# Patient Record
Sex: MALE | Race: WHITE | NOT HISPANIC OR LATINO | Employment: OTHER
[De-identification: names, ages, dates, MRNs, and addresses within clinical notes are randomized per-mention and may not be internally consistent; named-entity substitution may affect disease eponyms.]

---

## 2017-01-09 ENCOUNTER — SURGERY (OUTPATIENT)
Age: 62
End: 2017-01-09

## 2017-01-09 ENCOUNTER — ANESTHESIA (OUTPATIENT)
Dept: ENDOSCOPY | Facility: HOSPITAL | Age: 62
End: 2017-01-09
Payer: COMMERCIAL

## 2017-01-09 ENCOUNTER — ANESTHESIA EVENT (OUTPATIENT)
Dept: ENDOSCOPY | Facility: HOSPITAL | Age: 62
End: 2017-01-09
Payer: COMMERCIAL

## 2017-01-09 VITALS — RESPIRATION RATE: 18 BRPM

## 2017-01-09 PROBLEM — Z86.0100 HX OF COLONIC POLYPS: Status: ACTIVE | Noted: 2017-01-09

## 2017-01-09 PROBLEM — Z86.010 HX OF COLONIC POLYPS: Status: ACTIVE | Noted: 2017-01-09

## 2017-01-09 PROCEDURE — 63600175 PHARM REV CODE 636 W HCPCS: Performed by: NURSE ANESTHETIST, CERTIFIED REGISTERED

## 2017-01-09 PROCEDURE — D9220A PRA ANESTHESIA: Mod: CRNA,,, | Performed by: NURSE ANESTHETIST, CERTIFIED REGISTERED

## 2017-01-09 PROCEDURE — 25000003 PHARM REV CODE 250: Performed by: NURSE ANESTHETIST, CERTIFIED REGISTERED

## 2017-01-09 PROCEDURE — D9220A PRA ANESTHESIA: Mod: ANES,,, | Performed by: ANESTHESIOLOGY

## 2017-01-09 RX ORDER — LIDOCAINE HCL/PF 100 MG/5ML
SYRINGE (ML) INTRAVENOUS
Status: DISCONTINUED | OUTPATIENT
Start: 2017-01-09 | End: 2017-01-09

## 2017-01-09 RX ORDER — PROPOFOL 10 MG/ML
VIAL (ML) INTRAVENOUS
Status: DISCONTINUED | OUTPATIENT
Start: 2017-01-09 | End: 2017-01-09

## 2017-01-09 RX ADMIN — PROPOFOL 50 MG: 10 INJECTION, EMULSION INTRAVENOUS at 10:01

## 2017-01-09 RX ADMIN — PROPOFOL 50 MG: 10 INJECTION, EMULSION INTRAVENOUS at 09:01

## 2017-01-09 RX ADMIN — LIDOCAINE HYDROCHLORIDE 100 MG: 20 INJECTION, SOLUTION INTRAVENOUS at 09:01

## 2017-01-09 RX ADMIN — PROPOFOL 100 MG: 10 INJECTION, EMULSION INTRAVENOUS at 09:01

## 2017-01-09 NOTE — ANESTHESIA POSTPROCEDURE EVALUATION
Anesthesia Post Evaluation    Patient: Matt Martinez    Procedure(s) Performed: Procedure(s) (LRB):  COLONOSCOPY (N/A)    Final Anesthesia Type: general  Patient location during evaluation: PACU  Patient participation: Yes- Able to Participate  Level of consciousness: awake and alert  Post-procedure vital signs: reviewed and stable  Pain management: adequate  Airway patency: patent  PONV status at discharge: No PONV  Anesthetic complications: no      Cardiovascular status: hemodynamically stable  Respiratory status: unassisted and room air  Hydration status: euvolemic  Follow-up not needed.        Visit Vitals    /67 (BP Location: Left arm, Patient Position: Lying, BP Method: Automatic)    Pulse 73    Temp 36.4 °C (97.6 °F) (Oral)    Resp 15    Ht 6' (1.829 m)    Wt (!) 176.9 kg (390 lb)    SpO2 95%    BMI 52.89 kg/m2       Pain/Gail Score: Pain Assessment Performed: Yes (1/9/2017 10:22 AM)  Presence of Pain: denies (1/9/2017 10:22 AM)  Pain Rating Prior to Med Admin: 1 (1/9/2017 10:22 AM)  Pain Rating Post Med Admin: 1 (1/9/2017 10:22 AM)  Gail Score: 10 (1/9/2017 10:22 AM)

## 2017-01-09 NOTE — TRANSFER OF CARE
Anesthesia Transfer of Care Note    Patient: Matt Martinez    Procedure(s) Performed: Procedure(s) (LRB):  COLONOSCOPY (N/A)    Patient location: GI    Anesthesia Type: general    Transport from OR: Transported from OR on 2-3 L/min O2 by NC with adequate spontaneous ventilation    Post pain: adequate analgesia    Post assessment: no apparent anesthetic complications    Post vital signs: stable    Level of consciousness: awake, oriented and alert    Nausea/Vomiting: no nausea/vomiting    Complications: none          Last vitals:   Visit Vitals    /64    Pulse 74    Temp 36.8 °C (98.2 °F) (Oral)    Resp (!) 24    Ht 6' (1.829 m)    Wt (!) 176.9 kg (390 lb)    SpO2 (!) 91%    BMI 52.89 kg/m2

## 2017-01-09 NOTE — ANESTHESIA PREPROCEDURE EVALUATION
01/09/2017  Matt Martinez is a 61 y.o., male.    [facility] Anesthesia Evaluation    I have reviewed the Patient Summary Reports.    I have reviewed the Nursing Notes.      Review of Systems  Anesthesia Hx:  No problems with previous Anesthesia    Social:  Non-Smoker    Hematology/Oncology:  Hematology Normal   Oncology Normal     EENT/Dental:EENT/Dental Normal   Cardiovascular:   Hypertension, well controlled    Pulmonary:  Pulmonary Normal    Renal/:  Renal/ Normal     Hepatic/GI:   Bowel Prep.    Musculoskeletal:  Musculoskeletal Normal    Neurological:  Neurology Normal    Endocrine:  Endocrine Normal    Dermatological:  Skin Normal    Psych:  Psychiatric Normal           Physical Exam  General:  Morbid Obesity    Airway/Jaw/Neck:  AIRWAY FINDINGS: Normal      Eyes/Ears/Nose:  EYES/EARS/NOSE FINDINGS: Normal   Dental:  DENTAL FINDINGS: Normal   Chest/Lungs:  Chest/Lungs Findings: Clear to auscultation     Heart/Vascular:  Heart Findings: Rate: Normal  Rhythm: Regular Rhythm  Sounds: Normal  Heart murmur: negative Vascular Findings: Normal    Abdomen:  Abdomen Findings: Normal    Musculoskeletal:  Musculoskeletal Findings: Normal   Skin:  Skin Findings: Normal    Mental Status:  Mental Status Findings: Normal        Anesthesia Plan  Type of Anesthesia, risks & benefits discussed:  Anesthesia Type:  general  Patient's Preference:   Intra-op Monitoring Plan:   Intra-op Monitoring Plan Comments:   Post Op Pain Control Plan:   Post Op Pain Control Plan Comments:   Induction:   IV  Beta Blocker:  Patient is not currently on a Beta-Blocker (No further documentation required).       Informed Consent: Patient understands risks and agrees with Anesthesia plan.  Questions answered. Anesthesia consent signed with patient.  ASA Score: 3     Day of Surgery Review of History & Physical:    H&P update referred to the  provider.         Ready For Surgery From Anesthesia Perspective.

## 2020-06-24 ENCOUNTER — LAB VISIT (OUTPATIENT)
Dept: PRIMARY CARE CLINIC | Facility: OTHER | Age: 65
End: 2020-06-24
Payer: COMMERCIAL

## 2020-06-24 DIAGNOSIS — Z11.59 SPECIAL SCREENING EXAMINATION FOR UNSPECIFIED VIRAL DISEASE: ICD-10-CM

## 2020-06-24 PROCEDURE — U0003 INFECTIOUS AGENT DETECTION BY NUCLEIC ACID (DNA OR RNA); SEVERE ACUTE RESPIRATORY SYNDROME CORONAVIRUS 2 (SARS-COV-2) (CORONAVIRUS DISEASE [COVID-19]), AMPLIFIED PROBE TECHNIQUE, MAKING USE OF HIGH THROUGHPUT TECHNOLOGIES AS DESCRIBED BY CMS-2020-01-R: HCPCS | Mod: ST72

## 2020-06-28 LAB — SARS-COV-2 RNA RESP QL NAA+PROBE: DETECTED

## 2021-05-06 ENCOUNTER — PATIENT MESSAGE (OUTPATIENT)
Dept: RESEARCH | Facility: HOSPITAL | Age: 66
End: 2021-05-06

## 2022-08-04 ENCOUNTER — OFFICE VISIT (OUTPATIENT)
Dept: ORTHOPEDICS | Facility: CLINIC | Age: 67
End: 2022-08-04
Payer: MEDICARE

## 2022-08-04 VITALS — HEIGHT: 72 IN | WEIGHT: 315 LBS | BODY MASS INDEX: 42.66 KG/M2

## 2022-08-04 DIAGNOSIS — M79.604 PAIN AND SWELLING OF RIGHT LOWER EXTREMITY: ICD-10-CM

## 2022-08-04 DIAGNOSIS — M79.89 PAIN AND SWELLING OF RIGHT LOWER EXTREMITY: ICD-10-CM

## 2022-08-04 DIAGNOSIS — I73.9 VASCULAR CLAUDICATION: ICD-10-CM

## 2022-08-04 DIAGNOSIS — M48.061 LUMBAR FORAMINAL STENOSIS: ICD-10-CM

## 2022-08-04 DIAGNOSIS — M17.11 PRIMARY OSTEOARTHRITIS OF RIGHT KNEE: Primary | ICD-10-CM

## 2022-08-04 PROCEDURE — 20610 LARGE JOINT ASPIRATION/INJECTION: R KNEE: ICD-10-PCS | Mod: RT,S$GLB,, | Performed by: ORTHOPAEDIC SURGERY

## 2022-08-04 PROCEDURE — 1101F PT FALLS ASSESS-DOCD LE1/YR: CPT | Mod: CPTII,S$GLB,, | Performed by: ORTHOPAEDIC SURGERY

## 2022-08-04 PROCEDURE — 3008F PR BODY MASS INDEX (BMI) DOCUMENTED: ICD-10-PCS | Mod: CPTII,S$GLB,, | Performed by: ORTHOPAEDIC SURGERY

## 2022-08-04 PROCEDURE — 1160F PR REVIEW ALL MEDS BY PRESCRIBER/CLIN PHARMACIST DOCUMENTED: ICD-10-PCS | Mod: CPTII,S$GLB,, | Performed by: ORTHOPAEDIC SURGERY

## 2022-08-04 PROCEDURE — 3008F BODY MASS INDEX DOCD: CPT | Mod: CPTII,S$GLB,, | Performed by: ORTHOPAEDIC SURGERY

## 2022-08-04 PROCEDURE — 99203 PR OFFICE/OUTPT VISIT, NEW, LEVL III, 30-44 MIN: ICD-10-PCS | Mod: 25,S$GLB,, | Performed by: ORTHOPAEDIC SURGERY

## 2022-08-04 PROCEDURE — 1125F PR PAIN SEVERITY QUANTIFIED, PAIN PRESENT: ICD-10-PCS | Mod: CPTII,S$GLB,, | Performed by: ORTHOPAEDIC SURGERY

## 2022-08-04 PROCEDURE — 99203 OFFICE O/P NEW LOW 30 MIN: CPT | Mod: 25,S$GLB,, | Performed by: ORTHOPAEDIC SURGERY

## 2022-08-04 PROCEDURE — 3288F FALL RISK ASSESSMENT DOCD: CPT | Mod: CPTII,S$GLB,, | Performed by: ORTHOPAEDIC SURGERY

## 2022-08-04 PROCEDURE — 20610 DRAIN/INJ JOINT/BURSA W/O US: CPT | Mod: RT,S$GLB,, | Performed by: ORTHOPAEDIC SURGERY

## 2022-08-04 PROCEDURE — 1160F RVW MEDS BY RX/DR IN RCRD: CPT | Mod: CPTII,S$GLB,, | Performed by: ORTHOPAEDIC SURGERY

## 2022-08-04 PROCEDURE — 1101F PR PT FALLS ASSESS DOC 0-1 FALLS W/OUT INJ PAST YR: ICD-10-PCS | Mod: CPTII,S$GLB,, | Performed by: ORTHOPAEDIC SURGERY

## 2022-08-04 PROCEDURE — 1159F PR MEDICATION LIST DOCUMENTED IN MEDICAL RECORD: ICD-10-PCS | Mod: CPTII,S$GLB,, | Performed by: ORTHOPAEDIC SURGERY

## 2022-08-04 PROCEDURE — 1159F MED LIST DOCD IN RCRD: CPT | Mod: CPTII,S$GLB,, | Performed by: ORTHOPAEDIC SURGERY

## 2022-08-04 PROCEDURE — 3288F PR FALLS RISK ASSESSMENT DOCUMENTED: ICD-10-PCS | Mod: CPTII,S$GLB,, | Performed by: ORTHOPAEDIC SURGERY

## 2022-08-04 PROCEDURE — 1125F AMNT PAIN NOTED PAIN PRSNT: CPT | Mod: CPTII,S$GLB,, | Performed by: ORTHOPAEDIC SURGERY

## 2022-08-04 RX ORDER — TRIAMCINOLONE ACETONIDE 40 MG/ML
40 INJECTION, SUSPENSION INTRA-ARTICULAR; INTRAMUSCULAR
Status: DISCONTINUED | OUTPATIENT
Start: 2022-08-04 | End: 2022-08-04 | Stop reason: HOSPADM

## 2022-08-04 RX ORDER — NAPROXEN 500 MG/1
500 TABLET ORAL DAILY PRN
COMMUNITY
Start: 2022-07-08 | End: 2022-09-20

## 2022-08-04 RX ORDER — GABAPENTIN 300 MG/1
CAPSULE ORAL
COMMUNITY
Start: 2022-05-01 | End: 2022-09-20

## 2022-08-04 RX ORDER — HYDROCODONE BITARTRATE AND ACETAMINOPHEN 5; 325 MG/1; MG/1
TABLET ORAL
COMMUNITY
Start: 2022-07-08 | End: 2022-09-20

## 2022-08-04 RX ADMIN — TRIAMCINOLONE ACETONIDE 40 MG: 40 INJECTION, SUSPENSION INTRA-ARTICULAR; INTRAMUSCULAR at 08:08

## 2022-08-04 NOTE — PROCEDURES
Large Joint Aspiration/Injection: R knee    Date/Time: 8/4/2022 8:30 AM  Performed by: Romeo Mccarty MD  Authorized by: Romeo Mccarty MD     Consent Done?:  Yes (Verbal)  Indications:  Pain  Site marked: the procedure site was marked    Timeout: prior to procedure the correct patient, procedure, and site was verified    Prep: patient was prepped and draped in usual sterile fashion      Local anesthesia used?: Yes    Local anesthetic:  Lidocaine 1% without epinephrine  Ultrasonic Guidance for needle placement?: No    Location:  Knee  Site:  R knee  Medications:  40 mg triamcinolone acetonide 40 mg/mL  Patient tolerance:  Patient tolerated the procedure well with no immediate complications

## 2022-08-04 NOTE — PROGRESS NOTES
[facility]    Subjective:     Chief Complaint:   Chief Complaint   Patient presents with    Right Lower Leg - Pain     Pain in right calf that has been going on since December. Had had MRI with Dr Pacheco on back and it is not his back, was sent to Dr Little and was told it is muscular. Very painful to walk. Describes as an icepick sensation. Also had lower leg MRI       Past Medical History:   Diagnosis Date    Colon polyp     HTN (hypertension)        Past Surgical History:   Procedure Laterality Date    COLONOSCOPY  2011    colon polyps removed    COLONOSCOPY N/A 1/9/2017    Procedure: COLONOSCOPY;  Surgeon: Alexei Claudio MD;  Location: [facility];  Service: Endoscopy;  Laterality: N/A;       Current Outpatient Medications   Medication Sig    amlodipine (NORVASC) 10 MG tablet Take 15 mg by mouth once daily.    gabapentin (NEURONTIN) 300 MG capsule     hydrochlorothiazide (HYDRODIURIL) 25 MG tablet Take 25 mg by mouth once daily.    HYDROcodone-acetaminophen (NORCO) 5-325 mg per tablet     losartan (COZAAR) 50 MG tablet Take 50 mg by mouth 2 (two) times daily.    naproxen (NAPROSYN) 500 MG tablet Take 500 mg by mouth daily as needed.     No current facility-administered medications for this visit.       Review of patient's allergies indicates:   Allergen Reactions    Simvastatin      Other reaction(s): Muscle pain       Family History   Problem Relation Age of Onset    Pancreatic cancer Mother     Colon cancer Brother         unsure of age of diagnosis    Crohn's disease Neg Hx     Ulcerative colitis Neg Hx        Social History     Socioeconomic History    Marital status:    Tobacco Use    Smoking status: Never Smoker    Smokeless tobacco: Never Used   Substance and Sexual Activity    Alcohol use: Yes     Comment: socially    Drug use: No       History of present illness:  Patient comes in today for the right leg.  He has had right leg pain for about a year.  He has had  extensive workup by an excellent outside orthopedist.  Unfortunately has persistent discomfort      Review of Systems:    Constitution: Negative for chills, fever, and sweats.  Negative for unexplained weight loss.    HENT:  Negative for headaches and blurry vision.    Cardiovascular:Negative for chest pain or irregular heart beat. Negative for hypertension.    Respiratory:  Negative for cough and shortness of breath.    Gastrointestinal: Negative for abdominal pain, heartburn, melena, nausea, and vomitting.    Genitourinary:  Negative bladder incontinence and dysuria.    Musculoskeletal:  See HPI for details.     Neurological: Negative for numbness.    Psychiatric/Behavioral: Negative for depression.  The patient is not nervous/anxious.      Endocrine: Negative for polyuria    Hematologic/Lymphatic: Negative for bleeding problem.  Does not bruise/bleed easily.    Skin: Negative for poor would healing and rash    Objective:      Physical Examination:    Vital Signs:  There were no vitals filed for this visit.    Body mass index is 52.89 kg/m².    This a well-developed, well nourished patient in no acute distress.  They are alert and oriented and cooperative to examination.        Patient has a positive straight leg raise on the right.  He has good pulses in the lower extremities.  He has varus deformities of the bilateral knees.  He has 1+ effusion of the bilateral knees.  Pertinent New Results:  MRI of the lumbar spine is reviewed.  MRI of the right lower extremity is reviewed.  Those MRIs are attached.  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    Assessment/Plan:      Severe spinal stenosis with radicular pain.  I truly believe that this is with generalized discomfort is coming from.  He has a very positive straight leg raise.  His pain is concordant.  I believe he needs  an epidural steroid injection.  We have sent him for that.  I also want him in evaluated by Dr. Victor.  We will see him back p.r.n.      This note was created using Dragon voice recognition software that occasionally misinterpreted phrases or words.

## 2022-08-15 ENCOUNTER — OFFICE VISIT (OUTPATIENT)
Dept: ORTHOPEDICS | Facility: CLINIC | Age: 67
End: 2022-08-15
Payer: MEDICARE

## 2022-08-15 ENCOUNTER — TELEPHONE (OUTPATIENT)
Dept: PAIN MEDICINE | Facility: CLINIC | Age: 67
End: 2022-08-15
Payer: MEDICARE

## 2022-08-15 VITALS — HEIGHT: 72 IN | BODY MASS INDEX: 42.66 KG/M2 | WEIGHT: 315 LBS

## 2022-08-15 DIAGNOSIS — M48.061 LUMBAR FORAMINAL STENOSIS: Primary | ICD-10-CM

## 2022-08-15 DIAGNOSIS — M47.816 FACET ARTHRITIS OF LUMBAR REGION: ICD-10-CM

## 2022-08-15 DIAGNOSIS — M51.36 LUMBAR DEGENERATIVE DISC DISEASE: ICD-10-CM

## 2022-08-15 DIAGNOSIS — M51.26 PROTRUSION OF LUMBAR INTERVERTEBRAL DISC: ICD-10-CM

## 2022-08-15 PROCEDURE — 1159F MED LIST DOCD IN RCRD: CPT | Mod: CPTII,S$GLB,, | Performed by: ORTHOPAEDIC SURGERY

## 2022-08-15 PROCEDURE — 1160F PR REVIEW ALL MEDS BY PRESCRIBER/CLIN PHARMACIST DOCUMENTED: ICD-10-PCS | Mod: CPTII,S$GLB,, | Performed by: ORTHOPAEDIC SURGERY

## 2022-08-15 PROCEDURE — 3008F BODY MASS INDEX DOCD: CPT | Mod: CPTII,S$GLB,, | Performed by: ORTHOPAEDIC SURGERY

## 2022-08-15 PROCEDURE — 1125F AMNT PAIN NOTED PAIN PRSNT: CPT | Mod: CPTII,S$GLB,, | Performed by: ORTHOPAEDIC SURGERY

## 2022-08-15 PROCEDURE — 1101F PT FALLS ASSESS-DOCD LE1/YR: CPT | Mod: CPTII,S$GLB,, | Performed by: ORTHOPAEDIC SURGERY

## 2022-08-15 PROCEDURE — 3288F PR FALLS RISK ASSESSMENT DOCUMENTED: ICD-10-PCS | Mod: CPTII,S$GLB,, | Performed by: ORTHOPAEDIC SURGERY

## 2022-08-15 PROCEDURE — 99213 OFFICE O/P EST LOW 20 MIN: CPT | Mod: S$GLB,,, | Performed by: ORTHOPAEDIC SURGERY

## 2022-08-15 PROCEDURE — 1125F PR PAIN SEVERITY QUANTIFIED, PAIN PRESENT: ICD-10-PCS | Mod: CPTII,S$GLB,, | Performed by: ORTHOPAEDIC SURGERY

## 2022-08-15 PROCEDURE — 1160F RVW MEDS BY RX/DR IN RCRD: CPT | Mod: CPTII,S$GLB,, | Performed by: ORTHOPAEDIC SURGERY

## 2022-08-15 PROCEDURE — 1159F PR MEDICATION LIST DOCUMENTED IN MEDICAL RECORD: ICD-10-PCS | Mod: CPTII,S$GLB,, | Performed by: ORTHOPAEDIC SURGERY

## 2022-08-15 PROCEDURE — 3008F PR BODY MASS INDEX (BMI) DOCUMENTED: ICD-10-PCS | Mod: CPTII,S$GLB,, | Performed by: ORTHOPAEDIC SURGERY

## 2022-08-15 PROCEDURE — 3288F FALL RISK ASSESSMENT DOCD: CPT | Mod: CPTII,S$GLB,, | Performed by: ORTHOPAEDIC SURGERY

## 2022-08-15 PROCEDURE — 1101F PR PT FALLS ASSESS DOC 0-1 FALLS W/OUT INJ PAST YR: ICD-10-PCS | Mod: CPTII,S$GLB,, | Performed by: ORTHOPAEDIC SURGERY

## 2022-08-15 PROCEDURE — 99213 PR OFFICE/OUTPT VISIT, EST, LEVL III, 20-29 MIN: ICD-10-PCS | Mod: S$GLB,,, | Performed by: ORTHOPAEDIC SURGERY

## 2022-08-15 NOTE — TELEPHONE ENCOUNTER
Procedure Information    Service Details  Procedure Modifiers Provider Requested Approved   [id number] - AMB REFERRAL/CONSULT TO PAIN CLINIC None Matt Ma MD 1 1     Diagnosis Information    Diagnosis   M48.061 (ICD-10-CM) - Lumbar foraminal stenosis   M51.36 (ICD-10-CM) - Lumbar degenerative disc disease   M47.816 (ICD-10-CM) - Facet arthritis of lumbar region       Referral Notes  Number of Notes: 1    .  Type Date User Summary Attachment   Provider Comments 08/15/2022  3:17 PM Brock Victor MD Provider Comments -   Note    Eval and Treat for Right Lumbar TFESI vs Right Lumbar Medial Branch Block Injections.                Ok to schedule or does he need an apt? Thanks

## 2022-08-16 ENCOUNTER — TELEPHONE (OUTPATIENT)
Dept: PAIN MEDICINE | Facility: CLINIC | Age: 67
End: 2022-08-16
Payer: MEDICARE

## 2022-08-16 NOTE — TELEPHONE ENCOUNTER
----- Message from Brittney Veronica sent at 8/16/2022 10:00 AM CDT -----  Contact: WifeRosa  Type: Appt Question    Who Called:  Wife    Best Call Back Number: [phone]    Additional Information: Has questions about pt's appt.  Please call back. Thanks.

## 2022-08-16 NOTE — TELEPHONE ENCOUNTER
Wife wanted to know if the apt was for the procedure. I explained no, it's an office visit to discuss the procedure.

## 2022-09-06 ENCOUNTER — TELEPHONE (OUTPATIENT)
Dept: PAIN MEDICINE | Facility: CLINIC | Age: 67
End: 2022-09-06

## 2022-09-06 ENCOUNTER — OFFICE VISIT (OUTPATIENT)
Dept: PAIN MEDICINE | Facility: CLINIC | Age: 67
End: 2022-09-06
Payer: MEDICARE

## 2022-09-06 VITALS
WEIGHT: 315 LBS | SYSTOLIC BLOOD PRESSURE: 150 MMHG | HEART RATE: 67 BPM | DIASTOLIC BLOOD PRESSURE: 89 MMHG | HEIGHT: 73 IN | BODY MASS INDEX: 41.75 KG/M2

## 2022-09-06 DIAGNOSIS — M54.16 CHRONIC LUMBAR RADICULOPATHY: Primary | ICD-10-CM

## 2022-09-06 DIAGNOSIS — M47.896 OTHER SPONDYLOSIS, LUMBAR REGION: ICD-10-CM

## 2022-09-06 DIAGNOSIS — M51.36 DDD (DEGENERATIVE DISC DISEASE), LUMBAR: ICD-10-CM

## 2022-09-06 PROCEDURE — 1101F PT FALLS ASSESS-DOCD LE1/YR: CPT | Mod: CPTII,S$GLB,, | Performed by: ANESTHESIOLOGY

## 2022-09-06 PROCEDURE — 3077F PR MOST RECENT SYSTOLIC BLOOD PRESSURE >= 140 MM HG: ICD-10-PCS | Mod: CPTII,S$GLB,, | Performed by: ANESTHESIOLOGY

## 2022-09-06 PROCEDURE — 99204 OFFICE O/P NEW MOD 45 MIN: CPT | Mod: S$GLB,,, | Performed by: ANESTHESIOLOGY

## 2022-09-06 PROCEDURE — 3079F DIAST BP 80-89 MM HG: CPT | Mod: CPTII,S$GLB,, | Performed by: ANESTHESIOLOGY

## 2022-09-06 PROCEDURE — 99204 PR OFFICE/OUTPT VISIT, NEW, LEVL IV, 45-59 MIN: ICD-10-PCS | Mod: S$GLB,,, | Performed by: ANESTHESIOLOGY

## 2022-09-06 PROCEDURE — 1159F MED LIST DOCD IN RCRD: CPT | Mod: CPTII,S$GLB,, | Performed by: ANESTHESIOLOGY

## 2022-09-06 PROCEDURE — 3008F BODY MASS INDEX DOCD: CPT | Mod: CPTII,S$GLB,, | Performed by: ANESTHESIOLOGY

## 2022-09-06 PROCEDURE — 1159F PR MEDICATION LIST DOCUMENTED IN MEDICAL RECORD: ICD-10-PCS | Mod: CPTII,S$GLB,, | Performed by: ANESTHESIOLOGY

## 2022-09-06 PROCEDURE — 3077F SYST BP >= 140 MM HG: CPT | Mod: CPTII,S$GLB,, | Performed by: ANESTHESIOLOGY

## 2022-09-06 PROCEDURE — 3079F PR MOST RECENT DIASTOLIC BLOOD PRESSURE 80-89 MM HG: ICD-10-PCS | Mod: CPTII,S$GLB,, | Performed by: ANESTHESIOLOGY

## 2022-09-06 PROCEDURE — 3288F PR FALLS RISK ASSESSMENT DOCUMENTED: ICD-10-PCS | Mod: CPTII,S$GLB,, | Performed by: ANESTHESIOLOGY

## 2022-09-06 PROCEDURE — 1125F AMNT PAIN NOTED PAIN PRSNT: CPT | Mod: CPTII,S$GLB,, | Performed by: ANESTHESIOLOGY

## 2022-09-06 PROCEDURE — 1101F PR PT FALLS ASSESS DOC 0-1 FALLS W/OUT INJ PAST YR: ICD-10-PCS | Mod: CPTII,S$GLB,, | Performed by: ANESTHESIOLOGY

## 2022-09-06 PROCEDURE — 99999 PR PBB SHADOW E&M-EST. PATIENT-LVL III: ICD-10-PCS | Mod: PBBFAC,,, | Performed by: ANESTHESIOLOGY

## 2022-09-06 PROCEDURE — 1125F PR PAIN SEVERITY QUANTIFIED, PAIN PRESENT: ICD-10-PCS | Mod: CPTII,S$GLB,, | Performed by: ANESTHESIOLOGY

## 2022-09-06 PROCEDURE — 3008F PR BODY MASS INDEX (BMI) DOCUMENTED: ICD-10-PCS | Mod: CPTII,S$GLB,, | Performed by: ANESTHESIOLOGY

## 2022-09-06 PROCEDURE — 99999 PR PBB SHADOW E&M-EST. PATIENT-LVL III: CPT | Mod: PBBFAC,,, | Performed by: ANESTHESIOLOGY

## 2022-09-06 PROCEDURE — 3288F FALL RISK ASSESSMENT DOCD: CPT | Mod: CPTII,S$GLB,, | Performed by: ANESTHESIOLOGY

## 2022-09-06 NOTE — PROGRESS NOTES
Referring Physician: Brock Victor MD    PCP: Reshma Fairbanks MD    CC: back and leg pain    HPI:   Matt Martinez is a 67 y.o. male with PMH significant for HTN and obesity presents as a referral for the evaluation of back and leg pain. The patient reports that his pain began approximately Christmas 2021 after no inciting incident or trauma. The patient denies of improvement since onset. The patient localizes his worst pain to the posterolateral aspect of his right calf. The patient believes his calf and back pain are related. The patient denies of radiation into his right foot. The patient describes his pain as a sharp type of pain. The patient denies of numbness. The patient reports that his pain is a 0/10 as he is currently sitting. The patient reports that his pain increases to a 9/10 at its worst. Patient denies of any urinary/fecal incontinence, saddle anesthesia, or weakness.      Aggravating factors: standing up straight    Mitigating factors: leaning forward and putting his hands on his knees    Relevant Surgeries: no    Interventional Therapies: yes; knee injection via Dr. Mccarty - denies of any benefit.     : Not applicable      Non-pharmacologic Treatment:     Physical Therapy: yes; completed 6 weeks of physical therapy and dry needling. Dry needling provided no benefit.   Ice/Heat: no  TENS: yes  Massage: no  Chiropractic care: no  Acupuncture: no  Other: Relief Factor         Pain Medications:         Currently taking: salon pas; Tylenol PM QHS    Has tried in the past:    Opioids: yes; norco   NSAIDS: yes; naproxen  Tylenol: yes  Muscle relaxants: no  TCAs: no  SNRIs: no  Anticonvulsants: yes; gabapentin  topical creams: yes    Anticoagulation: no    ROS:  Review of Systems   Constitutional:  Negative for chills and fever.   HENT:  Negative for tinnitus.    Eyes:  Negative for visual disturbance.   Respiratory:  Negative for shortness of breath.    Cardiovascular:  Negative for chest pain.  "  Gastrointestinal:  Negative for nausea and vomiting.   Genitourinary:  Negative for difficulty urinating.   Musculoskeletal:  Positive for back pain.   Skin:  Negative for rash.   Allergic/Immunologic: Negative for immunocompromised state.   Neurological:  Negative for syncope.   Hematological:  Does not bruise/bleed easily.   Psychiatric/Behavioral:  Negative for suicidal ideas.       Past Medical History:   Diagnosis Date    Colon polyp     HTN (hypertension)      Past Surgical History:   Procedure Laterality Date    COLONOSCOPY  2011    colon polyps removed    COLONOSCOPY N/A 1/9/2017    Procedure: COLONOSCOPY;  Surgeon: Alexei Claudio MD;  Location: [facility];  Service: Endoscopy;  Laterality: N/A;     Family History   Problem Relation Age of Onset    Pancreatic cancer Mother     Colon cancer Brother         unsure of age of diagnosis    Crohn's disease Neg Hx     Ulcerative colitis Neg Hx      Social History     Socioeconomic History    Marital status:    Tobacco Use    Smoking status: Never    Smokeless tobacco: Never   Substance and Sexual Activity    Alcohol use: Yes     Comment: socially    Drug use: No         Allergies: See med card    Vitals:    09/06/22 0833   BP: (!) 150/89   Pulse: 67   Weight: (!) 176.9 kg (390 lb)   Height: 6' 1" (1.854 m)   PainSc:   5   PainLoc: Back         Physical exam:  Physical Exam  Vitals and nursing note reviewed.   Constitutional:       Appearance: Normal appearance. He is not toxic-appearing or diaphoretic.   HENT:      Head: Normocephalic and atraumatic.   Eyes:      General:         Right eye: No discharge.         Left eye: No discharge.      Extraocular Movements: Extraocular movements intact.      Conjunctiva/sclera: Conjunctivae normal.   Cardiovascular:      Rate and Rhythm: Normal rate.   Pulmonary:      Effort: Pulmonary effort is normal. No respiratory distress.      Breath sounds: Normal breath sounds.   Abdominal:      Palpations: Abdomen is soft. "   Skin:     General: Skin is warm and dry.      Findings: No rash.   Neurological:      Mental Status: He is alert and oriented to person, place, and time.   Psychiatric:         Mood and Affect: Mood and affect normal.         Cognition and Memory: Memory normal.         Judgment: Judgment normal.        UPPER EXTREMITIES: Normal alignment, normal range of motion, no atrophy, no skin changes,  hair growth and nail growth normal and equal bilaterally. No swelling, no tenderness.    LOWER EXTREMITIES:  Normal alignment, normal range of motion, no atrophy, no skin changes,  hair growth and nail growth normal and equal bilaterally. No swelling, no tenderness.    LUMBAR SPINE  Lumbar spine: ROM is full with flexion but limited with extension and oblique extension with increased pain with extension but relief with flexion.    ((+)) Supine straight leg raise on the right   ((+)) Facet loading   Internal and external rotation of the hip causes no increased pain on either side.  Myofascial exam: No tenderness to palpation across lumbar paraspinous muscles.    ((--)) TTP at the SI joint  ((+)) CONSTANTINO's test  ((--)) Distraction test  ((+)) Thigh thrust    CRANIAL NERVES:  II:  PERRL bilaterally,   III,IV,VI: EOMI.    V:  Facial sensation equal bilaterally  VII:  Facial motor function normal.  VIII:  Hearing equal to finger rub bilaterally  IX/X: Gag normal, palate symmetric  XI:  Shoulder shrug equal, head turn equal  XII:  Tongue midline without fasciculations      MOTOR: Tone and bulk: normal     MUSCLE STRENGTH:  Hip Flexion: Right 5/5, Left 5/5  Hip Extension: Right 5/5, Left 5/5  Leg Flexion: Right 5/5, Left 5/5  Leg Extension: Right 5/5, Left 5/5  Plantar Flexion: Right 5/5, Left 5/5  Dorsiflexion: Right 5/5, Left 5/5    Delt Bi Tri     Right: 5/5 5/5 5/5 5/5   Left: 5/5 5/5 5/5 5/5     SENSATION: Light touch and pinprick intact bilaterally  REFLEXES: normal, symmetric, nonbrisk.  Toes down, no clonus. Negative  beyer's sign bilaterally.  GAIT: normal rise, base, steps, and arm swing.      Imaging:            Assessment: Matt Martinez is a 67 y.o. male with PMH significant for HTN and obesity presents as a referral for the evaluation of back and leg pain. The patient localizes his worst pain to the posterolateral aspect of his right calf. The patient believes his calf and back pain are related. Patient with positive SLR on the right side on physical examination. MRI of the lumbar spine significant for severe, multi-level DDD and neural foraminal stenosis. I do believe a component of his pain has contributions from chronic lumbar radiculopathy. Treatment plan outlined below.     Plan:  - Schedule for right L3-L4, L4-L5, L5-S1 transforaminal epidural steroid injeciton  - I have stressed the importance of physical activity and a home exercise plan to help with chronic pain and improve health.  - Continue OTC Tylenol for pain  - RTC for the procedure as outlined above     Thank you for referring this interesting patient, and I look forward to continuing to collaborate in his care.    Mara Jackson MD  Pain Management

## 2022-09-06 NOTE — TELEPHONE ENCOUNTER
Dr Jackson , insurance will only approve 2 levels. Which one would you like to get rid of before I submit? Thank you         Procedure -> Transforaminal Injection (Specify level and laterality) Cmt: Right              L3-L4, L4-L5, L5-S1 transforaminal epidural steroid injection      Order Specific Information   Order: Procedure Order to Pain Management [Custom: [id number]]  Order #:           [id number]: 1 FUTURE

## 2022-09-06 NOTE — H&P (VIEW-ONLY)
Referring Physician: Brock Victor MD    PCP: Reshma Fairbanks MD    CC: back and leg pain    HPI:   Matt Martinez is a 67 y.o. male with PMH significant for HTN and obesity presents as a referral for the evaluation of back and leg pain. The patient reports that his pain began approximately Christmas 2021 after no inciting incident or trauma. The patient denies of improvement since onset. The patient localizes his worst pain to the posterolateral aspect of his right calf. The patient believes his calf and back pain are related. The patient denies of radiation into his right foot. The patient describes his pain as a sharp type of pain. The patient denies of numbness. The patient reports that his pain is a 0/10 as he is currently sitting. The patient reports that his pain increases to a 9/10 at its worst. Patient denies of any urinary/fecal incontinence, saddle anesthesia, or weakness.      Aggravating factors: standing up straight    Mitigating factors: leaning forward and putting his hands on his knees    Relevant Surgeries: no    Interventional Therapies: yes; knee injection via Dr. Mccarty - denies of any benefit.     : Not applicable      Non-pharmacologic Treatment:     Physical Therapy: yes; completed 6 weeks of physical therapy and dry needling. Dry needling provided no benefit.   Ice/Heat: no  TENS: yes  Massage: no  Chiropractic care: no  Acupuncture: no  Other: Relief Factor         Pain Medications:         Currently taking: salon pas; Tylenol PM QHS    Has tried in the past:    Opioids: yes; norco   NSAIDS: yes; naproxen  Tylenol: yes  Muscle relaxants: no  TCAs: no  SNRIs: no  Anticonvulsants: yes; gabapentin  topical creams: yes    Anticoagulation: no    ROS:  Review of Systems   Constitutional:  Negative for chills and fever.   HENT:  Negative for tinnitus.    Eyes:  Negative for visual disturbance.   Respiratory:  Negative for shortness of breath.    Cardiovascular:  Negative for chest pain.  "  Gastrointestinal:  Negative for nausea and vomiting.   Genitourinary:  Negative for difficulty urinating.   Musculoskeletal:  Positive for back pain.   Skin:  Negative for rash.   Allergic/Immunologic: Negative for immunocompromised state.   Neurological:  Negative for syncope.   Hematological:  Does not bruise/bleed easily.   Psychiatric/Behavioral:  Negative for suicidal ideas.       Past Medical History:   Diagnosis Date    Colon polyp     HTN (hypertension)      Past Surgical History:   Procedure Laterality Date    COLONOSCOPY  2011    colon polyps removed    COLONOSCOPY N/A 1/9/2017    Procedure: COLONOSCOPY;  Surgeon: Alexei Claudio MD;  Location: [facility];  Service: Endoscopy;  Laterality: N/A;     Family History   Problem Relation Age of Onset    Pancreatic cancer Mother     Colon cancer Brother         unsure of age of diagnosis    Crohn's disease Neg Hx     Ulcerative colitis Neg Hx      Social History     Socioeconomic History    Marital status:    Tobacco Use    Smoking status: Never    Smokeless tobacco: Never   Substance and Sexual Activity    Alcohol use: Yes     Comment: socially    Drug use: No         Allergies: See med card    Vitals:    09/06/22 0833   BP: (!) 150/89   Pulse: 67   Weight: (!) 176.9 kg (390 lb)   Height: 6' 1" (1.854 m)   PainSc:   5   PainLoc: Back         Physical exam:  Physical Exam  Vitals and nursing note reviewed.   Constitutional:       Appearance: Normal appearance. He is not toxic-appearing or diaphoretic.   HENT:      Head: Normocephalic and atraumatic.   Eyes:      General:         Right eye: No discharge.         Left eye: No discharge.      Extraocular Movements: Extraocular movements intact.      Conjunctiva/sclera: Conjunctivae normal.   Cardiovascular:      Rate and Rhythm: Normal rate.   Pulmonary:      Effort: Pulmonary effort is normal. No respiratory distress.      Breath sounds: Normal breath sounds.   Abdominal:      Palpations: Abdomen is soft. "   Skin:     General: Skin is warm and dry.      Findings: No rash.   Neurological:      Mental Status: He is alert and oriented to person, place, and time.   Psychiatric:         Mood and Affect: Mood and affect normal.         Cognition and Memory: Memory normal.         Judgment: Judgment normal.        UPPER EXTREMITIES: Normal alignment, normal range of motion, no atrophy, no skin changes,  hair growth and nail growth normal and equal bilaterally. No swelling, no tenderness.    LOWER EXTREMITIES:  Normal alignment, normal range of motion, no atrophy, no skin changes,  hair growth and nail growth normal and equal bilaterally. No swelling, no tenderness.    LUMBAR SPINE  Lumbar spine: ROM is full with flexion but limited with extension and oblique extension with increased pain with extension but relief with flexion.    ((+)) Supine straight leg raise on the right   ((+)) Facet loading   Internal and external rotation of the hip causes no increased pain on either side.  Myofascial exam: No tenderness to palpation across lumbar paraspinous muscles.    ((--)) TTP at the SI joint  ((+)) CONSTANTINO's test  ((--)) Distraction test  ((+)) Thigh thrust    CRANIAL NERVES:  II:  PERRL bilaterally,   III,IV,VI: EOMI.    V:  Facial sensation equal bilaterally  VII:  Facial motor function normal.  VIII:  Hearing equal to finger rub bilaterally  IX/X: Gag normal, palate symmetric  XI:  Shoulder shrug equal, head turn equal  XII:  Tongue midline without fasciculations      MOTOR: Tone and bulk: normal     MUSCLE STRENGTH:  Hip Flexion: Right 5/5, Left 5/5  Hip Extension: Right 5/5, Left 5/5  Leg Flexion: Right 5/5, Left 5/5  Leg Extension: Right 5/5, Left 5/5  Plantar Flexion: Right 5/5, Left 5/5  Dorsiflexion: Right 5/5, Left 5/5    Delt Bi Tri     Right: 5/5 5/5 5/5 5/5   Left: 5/5 5/5 5/5 5/5     SENSATION: Light touch and pinprick intact bilaterally  REFLEXES: normal, symmetric, nonbrisk.  Toes down, no clonus. Negative  beyer's sign bilaterally.  GAIT: normal rise, base, steps, and arm swing.      Imaging:            Assessment: Matt Martinez is a 67 y.o. male with PMH significant for HTN and obesity presents as a referral for the evaluation of back and leg pain. The patient localizes his worst pain to the posterolateral aspect of his right calf. The patient believes his calf and back pain are related. Patient with positive SLR on the right side on physical examination. MRI of the lumbar spine significant for severe, multi-level DDD and neural foraminal stenosis. I do believe a component of his pain has contributions from chronic lumbar radiculopathy. Treatment plan outlined below.     Plan:  - Schedule for right L3-L4, L4-L5, L5-S1 transforaminal epidural steroid injeciton  - I have stressed the importance of physical activity and a home exercise plan to help with chronic pain and improve health.  - Continue OTC Tylenol for pain  - RTC for the procedure as outlined above     Thank you for referring this interesting patient, and I look forward to continuing to collaborate in his care.    Mara Jackson MD  Pain Management

## 2022-09-07 ENCOUNTER — PATIENT MESSAGE (OUTPATIENT)
Dept: PAIN MEDICINE | Facility: CLINIC | Age: 67
End: 2022-09-07
Payer: MEDICARE

## 2022-09-23 ENCOUNTER — HOSPITAL ENCOUNTER (OUTPATIENT)
Facility: HOSPITAL | Age: 67
Discharge: HOME | End: 2022-09-23
Attending: ANESTHESIOLOGY | Admitting: ANESTHESIOLOGY
Payer: MEDICARE

## 2022-09-23 VITALS
HEIGHT: 73 IN | RESPIRATION RATE: 18 BRPM | HEART RATE: 66 BPM | WEIGHT: 315 LBS | OXYGEN SATURATION: 98 % | SYSTOLIC BLOOD PRESSURE: 138 MMHG | TEMPERATURE: 99 F | BODY MASS INDEX: 41.75 KG/M2 | DIASTOLIC BLOOD PRESSURE: 77 MMHG

## 2022-09-23 DIAGNOSIS — M54.16 LUMBAR RADICULOPATHY: Primary | ICD-10-CM

## 2022-09-23 PROCEDURE — 64483 NJX AA&/STRD TFRM EPI L/S 1: CPT | Mod: RT,,, | Performed by: ANESTHESIOLOGY

## 2022-09-23 PROCEDURE — 99900103 DSU ONLY-NO CHARGE-INITIAL HR (STAT): Performed by: ANESTHESIOLOGY

## 2022-09-23 PROCEDURE — 25000003 PHARM REV CODE 250: Performed by: ANESTHESIOLOGY

## 2022-09-23 PROCEDURE — 25500020 PHARM REV CODE 255: Performed by: ANESTHESIOLOGY

## 2022-09-23 PROCEDURE — 64483 PR EPIDURAL INJ, ANES/STEROID, TRANSFORAMINAL, LUMB/SACR, SNGL LEVL: ICD-10-PCS | Mod: RT,,, | Performed by: ANESTHESIOLOGY

## 2022-09-23 PROCEDURE — 64484 NJX AA&/STRD TFRM EPI L/S EA: CPT | Mod: RT,,, | Performed by: ANESTHESIOLOGY

## 2022-09-23 PROCEDURE — 63600175 PHARM REV CODE 636 W HCPCS: Performed by: ANESTHESIOLOGY

## 2022-09-23 PROCEDURE — 36000704 HC OR TIME LEV I 1ST 15 MIN: Performed by: ANESTHESIOLOGY

## 2022-09-23 PROCEDURE — 36000705 HC OR TIME LEV I EA ADD 15 MIN: Performed by: ANESTHESIOLOGY

## 2022-09-23 PROCEDURE — 64484 PRA INJECT ANES/STEROID FORAMEN LUMBAR/SACRAL W IMG GUIDE ,EA ADD LEVEL: ICD-10-PCS | Mod: RT,,, | Performed by: ANESTHESIOLOGY

## 2022-09-23 PROCEDURE — 99900104 DSU ONLY-NO CHARGE-EA ADD'L HR (STAT): Performed by: ANESTHESIOLOGY

## 2022-09-23 PROCEDURE — 71000015 HC POSTOP RECOV 1ST HR: Performed by: ANESTHESIOLOGY

## 2022-09-23 RX ORDER — BUPIVACAINE HYDROCHLORIDE 2.5 MG/ML
INJECTION, SOLUTION EPIDURAL; INFILTRATION; INTRACAUDAL
Status: DISCONTINUED | OUTPATIENT
Start: 2022-09-23 | End: 2022-09-23 | Stop reason: HOSPADM

## 2022-09-23 RX ORDER — DEXAMETHASONE SODIUM PHOSPHATE 10 MG/ML
INJECTION INTRAMUSCULAR; INTRAVENOUS
Status: DISCONTINUED | OUTPATIENT
Start: 2022-09-23 | End: 2022-09-23 | Stop reason: HOSPADM

## 2022-09-23 RX ORDER — MIDAZOLAM HYDROCHLORIDE 1 MG/ML
INJECTION INTRAMUSCULAR; INTRAVENOUS CODE/TRAUMA/SEDATION MEDICATION
Status: DISCONTINUED | OUTPATIENT
Start: 2022-09-23 | End: 2022-09-23 | Stop reason: HOSPADM

## 2022-09-23 RX ORDER — FENTANYL CITRATE 50 UG/ML
INJECTION, SOLUTION INTRAMUSCULAR; INTRAVENOUS CODE/TRAUMA/SEDATION MEDICATION
Status: DISCONTINUED | OUTPATIENT
Start: 2022-09-23 | End: 2022-09-23 | Stop reason: HOSPADM

## 2022-09-23 RX ORDER — SODIUM CHLORIDE, SODIUM LACTATE, POTASSIUM CHLORIDE, CALCIUM CHLORIDE 600; 310; 30; 20 MG/100ML; MG/100ML; MG/100ML; MG/100ML
INJECTION, SOLUTION INTRAVENOUS ONCE AS NEEDED
Status: ACTIVE | OUTPATIENT
Start: 2022-09-23 | End: 2034-02-18

## 2022-09-23 RX ORDER — LIDOCAINE HYDROCHLORIDE 10 MG/ML
INJECTION, SOLUTION EPIDURAL; INFILTRATION; INTRACAUDAL; PERINEURAL
Status: DISCONTINUED | OUTPATIENT
Start: 2022-09-23 | End: 2022-09-23 | Stop reason: HOSPADM

## 2022-09-23 NOTE — PLAN OF CARE
Pt ambulated in room with steady gait, denies pain or nausea.  States that he is ready for discharge.

## 2022-09-23 NOTE — DISCHARGE INSTRUCTIONS
Post Procedure Instructions    -For the first 24 hours after procedure apply an ice pack to the site for 20 minute periods and repeating can help relieve pain at the site.    -If you have stopped taking Aspirin prior to the procedure, please restart taking it from the day after your procedure. You may restart your Plavix or Coumadin the day after your procedure unless otherwise instructed.     Keep clear dressing on for 48 hrs, then remove. Leave paper strips on until they fall off.    -Avoid submerging area in water for 5 days so no bathtub, hot tub, swimming pool     May wet in shower after 48 hours    -If numbing medication was administered , be careful getting around .      General Information:    1.  Do not drink alcoholic beverages including beer for 24 hours or as long as you are on pain medication..  2.  Do not drive a motor vehicle, operate machinery or power tools, or signs legal papers for 24 hours or as long as you are on pain medication.   3.  You may experience light-headedness, dizziness, and sleepiness following surgery. Please do not stay alone. A responsible adult should be with you for this 24 hour period.  4.  Go home and rest.    5. Progress slowly to a normal diet unless instructed.  Otherwise, begin with liquids such as soft drinks, then soup and crackers working up to solid foods. Drink plenty of nonalcoholic fluids.  6.  Certain anesthetics and pain medications produce nausea and vomiting in certain       individuals. If nausea becomes a problem at home, call you doctor.    7. A nurse will be calling you sometime after surgery. Do not be alarmed. This is our way of finding out how you are doing.    8. Several times every hour while you are awake, take 2-3 deep breaths and cough. If you had stomach surgery hold a pillow or rolled towel firmly against your stomach before you cough. This will help with any pain the cough might cause.  9. Several times every hour while you are awake, pump and  flex your feet 5-6 times and do foot circles. This will help prevent blood clots.    10.Call your doctor for severe pain, bleeding, fever, or signs or symptoms of infection (pain, swelling, redness, foul odor, drainage).

## 2022-09-23 NOTE — OP NOTE
PROCEDURE DATE: 9/23/2022    PROCEDURE: Right L3-L4, L4-L5, L5-S1 transforaminal epidural steroid injection under fluoroscopy    DIAGNOSIS: M51.16 Intervertebral disc disorders with radiculopathy, lumbar region  Post op diagnosis: Same    PHYSICIAN: Mara Jackson MD    MEDICATIONS INJECTED:  Dexamethasone 10 mg (1 ml) and 5 ml 0.25% bupivicaine distributed evenly amongst at each nerve root.     LOCAL ANESTHETIC INJECTED:  Lidocaine 1%. 3 ml per site.    SEDATION MEDICATIONS: RN IV sedation    ESTIMATED BLOOD LOSS:  none    COMPLICATIONS:  none    TECHNIQUE:   A time-out was taken to identify patient and procedure side prior to starting the procedure. The patient was placed in a prone position, prepped and draped in the usual sterile fashion using ChloraPrep and sterile towels.  The area to be injected was determined under fluoroscopic guidance in AP and oblique view.  Local anesthetic was given by raising a wheal and going down to the hub of a 25-gauge 1.5 inch needle.  In oblique view, a 5 inch 22-gauge bent-tip spinal needle was introduced towards 6 oclock position of the pedicle of each above named nerve root level.  The needle was walked medially then hinged into the neural foramen and position was confirmed in AP and lateral views.  3 mL contrast dye was injected to confirm appropriate placement and that there was no vascular uptake.  After negative aspiration for blood or CSF, the medication was then injected. This was performed at the L3-L4, L4-L5, L5-S1 level(s). The patient tolerated the procedure well.    The patient was monitored after the procedure.  Patient was given post procedure and discharge instructions to follow at home. The patient was discharged in a stable condition.     Vital signs reviewed  GENERAL: Patient well appearing, NAD  HEAD: NCAT  EYES: PERRL, EOMI  ENT: MMM  NECK: Supple, non tender, trachea midline, no JVD  RESPIRATORY: Normal respiratory effort. CTA B/L. No wheezing, rales, rhonchi  CARDIOVASCULAR: Regular rate and rhythm. Normal S1/S2. No murmurs, rubs or gallops.  ABDOMEN: Soft. distended. Nontender. No guarding or rebound.   MUSCULOSKELETAL/EXTREMITIES: . No leg edema.  SKIN:  Warm and dry. No acute rash.  NEURO: AAOx3. No gross FND.  PSYCHIATRIC: Cooperative. Affect appropriate. Insight and judgement normal. Memory intact. Thought normal.

## 2022-09-23 NOTE — DISCHARGE SUMMARY
[facility]  Discharge Note  Short Stay    Procedure(s) (LRB):  Injection,steroid,epidural,transforaminal approach (Right)    OUTCOME: Patient tolerated treatment/procedure well without complication and is now ready for discharge.    DISPOSITION: Home or Self Care    FINAL DIAGNOSIS:  Lumbar radiculopathy    FOLLOWUP: In clinic    DISCHARGE INSTRUCTIONS:    Discharge Procedure Orders   Diet general     Call MD for:  temperature >100.4     Call MD for:  persistent nausea and vomiting     Call MD for:  severe uncontrolled pain     Call MD for:  difficulty breathing, headache or visual disturbances     Call MD for:  redness, tenderness, or signs of infection (pain, swelling, redness, odor or green/yellow discharge around incision site)     Call MD for:  hives     Call MD for:  persistent dizziness or light-headedness     Call MD for:  extreme fatigue        TIME SPENT ON DISCHARGE: 30 minutes

## 2022-10-17 ENCOUNTER — OFFICE VISIT (OUTPATIENT)
Dept: ORTHOPEDICS | Facility: CLINIC | Age: 67
End: 2022-10-17
Payer: MEDICARE

## 2022-10-17 VITALS
SYSTOLIC BLOOD PRESSURE: 138 MMHG | BODY MASS INDEX: 41.75 KG/M2 | DIASTOLIC BLOOD PRESSURE: 77 MMHG | WEIGHT: 315 LBS | HEIGHT: 73 IN

## 2022-10-17 DIAGNOSIS — M51.26 PROTRUSION OF LUMBAR INTERVERTEBRAL DISC: ICD-10-CM

## 2022-10-17 DIAGNOSIS — M48.061 LUMBAR FORAMINAL STENOSIS: Primary | ICD-10-CM

## 2022-10-17 DIAGNOSIS — M47.816 FACET ARTHRITIS OF LUMBAR REGION: ICD-10-CM

## 2022-10-17 DIAGNOSIS — M51.36 LUMBAR DEGENERATIVE DISC DISEASE: ICD-10-CM

## 2022-10-17 PROCEDURE — 1101F PT FALLS ASSESS-DOCD LE1/YR: CPT | Mod: CPTII,S$GLB,, | Performed by: ORTHOPAEDIC SURGERY

## 2022-10-17 PROCEDURE — 3078F PR MOST RECENT DIASTOLIC BLOOD PRESSURE < 80 MM HG: ICD-10-PCS | Mod: CPTII,S$GLB,, | Performed by: ORTHOPAEDIC SURGERY

## 2022-10-17 PROCEDURE — 1125F AMNT PAIN NOTED PAIN PRSNT: CPT | Mod: CPTII,S$GLB,, | Performed by: ORTHOPAEDIC SURGERY

## 2022-10-17 PROCEDURE — 1159F PR MEDICATION LIST DOCUMENTED IN MEDICAL RECORD: ICD-10-PCS | Mod: CPTII,S$GLB,, | Performed by: ORTHOPAEDIC SURGERY

## 2022-10-17 PROCEDURE — 1159F MED LIST DOCD IN RCRD: CPT | Mod: CPTII,S$GLB,, | Performed by: ORTHOPAEDIC SURGERY

## 2022-10-17 PROCEDURE — 3075F SYST BP GE 130 - 139MM HG: CPT | Mod: CPTII,S$GLB,, | Performed by: ORTHOPAEDIC SURGERY

## 2022-10-17 PROCEDURE — 99213 PR OFFICE/OUTPT VISIT, EST, LEVL III, 20-29 MIN: ICD-10-PCS | Mod: S$GLB,,, | Performed by: ORTHOPAEDIC SURGERY

## 2022-10-17 PROCEDURE — 1160F PR REVIEW ALL MEDS BY PRESCRIBER/CLIN PHARMACIST DOCUMENTED: ICD-10-PCS | Mod: CPTII,S$GLB,, | Performed by: ORTHOPAEDIC SURGERY

## 2022-10-17 PROCEDURE — 1125F PR PAIN SEVERITY QUANTIFIED, PAIN PRESENT: ICD-10-PCS | Mod: CPTII,S$GLB,, | Performed by: ORTHOPAEDIC SURGERY

## 2022-10-17 PROCEDURE — 3288F FALL RISK ASSESSMENT DOCD: CPT | Mod: CPTII,S$GLB,, | Performed by: ORTHOPAEDIC SURGERY

## 2022-10-17 PROCEDURE — 1160F RVW MEDS BY RX/DR IN RCRD: CPT | Mod: CPTII,S$GLB,, | Performed by: ORTHOPAEDIC SURGERY

## 2022-10-17 PROCEDURE — 3078F DIAST BP <80 MM HG: CPT | Mod: CPTII,S$GLB,, | Performed by: ORTHOPAEDIC SURGERY

## 2022-10-17 PROCEDURE — 1101F PR PT FALLS ASSESS DOC 0-1 FALLS W/OUT INJ PAST YR: ICD-10-PCS | Mod: CPTII,S$GLB,, | Performed by: ORTHOPAEDIC SURGERY

## 2022-10-17 PROCEDURE — 3075F PR MOST RECENT SYSTOLIC BLOOD PRESS GE 130-139MM HG: ICD-10-PCS | Mod: CPTII,S$GLB,, | Performed by: ORTHOPAEDIC SURGERY

## 2022-10-17 PROCEDURE — 3288F PR FALLS RISK ASSESSMENT DOCUMENTED: ICD-10-PCS | Mod: CPTII,S$GLB,, | Performed by: ORTHOPAEDIC SURGERY

## 2022-10-17 PROCEDURE — 99213 OFFICE O/P EST LOW 20 MIN: CPT | Mod: S$GLB,,, | Performed by: ORTHOPAEDIC SURGERY

## 2022-10-17 RX ORDER — METHOCARBAMOL 500 MG/1
500 TABLET, FILM COATED ORAL 4 TIMES DAILY
Qty: 120 TABLET | Refills: 2 | Status: SHIPPED | OUTPATIENT
Start: 2022-10-17 | End: 2023-01-13

## 2022-10-17 NOTE — PROGRESS NOTES
"Subjective:       Patient ID: Matt Martinez is a 67 y.o. male.    Chief Complaint: Pain of the Lumbar Spine (Lumbar Transforaminal BECCA F/U. Recd inj 09/23/22 which offered relief for about 2 weeks. Pain has since returned. States it is a 8/10)      History of Present Illness    Prior to meeting with the patient I reviewed the medical chart in [facility]. This included reviewing the previous progress notes from our office, review of the patient's last appointment with their primary care provider, review of any visits to the emergency room, and review of any pain management appointments or procedures.   Patient is here follow-up for right calf radicular type pain.  When he was last here we reviewed the MRI and referred him for an epidural steroid injection which she had done.  This did relieve his right calf pain for about 10 days.  It is still better than it was prior to the procedure but starting to return back to his prior level.    Current Medications  Current Outpatient Medications   Medication Sig Dispense Refill    amlodipine (NORVASC) 10 MG tablet Take 15 mg by mouth once daily.      hydrochlorothiazide (HYDRODIURIL) 25 MG tablet Take 25 mg by mouth once daily.      losartan (COZAAR) 50 MG tablet Take 50 mg by mouth 2 (two) times daily.       No current facility-administered medications for this visit.     Facility-Administered Medications Ordered in Other Visits   Medication Dose Route Frequency Provider Last Rate Last Admin    lactated ringers infusion   Intravenous Once PRN Mara Jackson MD           Allergies  Review of patient's allergies indicates:   Allergen Reactions    Simvastatin Other (See Comments)     WEAKNESS       Past Medical History  Past Medical History:   Diagnosis Date    Cancer     SQUAMOUS CELL CARCINOMA R FA    Hepatitis a without hepatic coma     Hepatitis a without hepatic coma     Hep A during [place] War....."Ate monkey meat."    HTN (hypertension)        Surgical History  Past Surgical " History:   Procedure Laterality Date    COLONOSCOPY  2011    colon polyps removed    COLONOSCOPY N/A 01/09/2017    Procedure: COLONOSCOPY;  Surgeon: Alexei Claudio MD;  Location: [facility];  Service: Endoscopy;  Laterality: N/A;    TONSILLECTOMY      TRANSFORAMINAL EPIDURAL INJECTION OF STEROID Right 9/23/2022    Procedure: Injection,steroid,epidural,transforaminal approach;  Surgeon: Mara Jackson MD;  Location: [facility] OR;  Service: Pain Management;  Laterality: Right;  L3-L4, L4-L5, L5-S1       Family History:   Family History   Problem Relation Age of Onset    Pancreatic cancer Mother     Colon cancer Brother         unsure of age of diagnosis    Crohn's disease Neg Hx     Ulcerative colitis Neg Hx        Social History:   Social History     Socioeconomic History    Marital status:    Tobacco Use    Smoking status: Never    Smokeless tobacco: Never   Substance and Sexual Activity    Alcohol use: Yes     Comment: socially    Drug use: No    Sexual activity: Yes     Partners: Female       Hospitalization/Major Diagnostic Procedure:     Review of Systems     General/Constitutional:  Chills denies. Fatigue denies. Fever denies. Weight gain denies. Weight loss denies.    Respiratory:  Shortness of breath denies.    Cardiovascular:  Chest pain denies.    Gastrointestinal:  Constipation denies. Diarrhea denies. Nausea denies. Vomiting denies.     Hematology:  Easy bruising denies. Prolonged bleeding denies.     Genitourinary:  Frequent urination denies. Pain in lower back denies. Painful urination denies.     Musculoskeletal:  See HPI for details    Skin:  Rash denies.    Neurologic:  Dizziness denies. Gait abnormalities denies. Seizures denies. Tingling/Numbess denies.    Psychiatric:  Anxiety denies. Depressed mood denies.     Objective:   Vital Signs:   Vitals:    10/17/22 1505   BP: 138/77        Physical Exam      General Examination:     Constitutional: The patient is alert and oriented to lace person  and time. Mood is pleasant.     Head/Face: Normal facial features normal eyebrows    Eyes: Normal extraocular motion bilaterally    Lungs: Respirations are equal and unlabored    Gait is coordinated.    Cardiovascular: There are no swelling or varicosities present.    Lymphatic: Negative for adenopathy    Skin: Normal    Neurological: Level of consciousness normal. Oriented to place person and time and situation    Psychiatric: Oriented to time place person and situation    Patient is morbidly obese.  Tenderness to palpation at the right calf.  Antalgic gait with decreased right lower weight-bearing.    Examination of the lumbar spine, the patient demonstrates normal range of motion with both flexion and extension of the lumbar spine.  Extension and rotation or lateral bending to the right will produce the right calf pain but again not radicular in nature of.  Isolated sharp stabbing pain to the posterior lateral aspect of the right calf.     Examination of the right lower extremity, the patient does have some thinning of the skin, it is absent of hair, he has pitting edema 2+.  No effusion noted in the knee, normal range of motion of the knee, no crepitus of the patellofemoral joint.  Range of motion 0-110 degrees.  Straight leg raise is negative.  Knee is stable anterior-posterior varus and valgus stress.  Of note patient had no significant pain or discomfort with abduction or adduction of the hip, no significant pain or limitations with flexion extension or internal external rotation of the right hip.    XRAY Report/ Interpretation:  No new studies today      Assessment:       1. Lumbar foraminal stenosis    2. Lumbar degenerative disc disease    3. Facet arthritis of lumbar region    4. Protrusion of lumbar intervertebral disc          Plan:       Matt was seen today for pain.    Diagnoses and all orders for this visit:    Lumbar foraminal stenosis  -     X-Ray Lumbar Spine Ap And Lateral  -     X-Ray Pelvis  Routine AP    Lumbar degenerative disc disease  -     X-Ray Lumbar Spine Ap And Lateral  -     X-Ray Pelvis Routine AP    Facet arthritis of lumbar region  -     X-Ray Lumbar Spine Ap And Lateral  -     X-Ray Pelvis Routine AP    Protrusion of lumbar intervertebral disc  -     X-Ray Lumbar Spine Ap And Lateral  -     X-Ray Pelvis Routine AP         No follow-ups on file.  This is to attest that the physician's assistant Yonis Atkins served in the capacity as a scribe for this patient's encounter.  This is also to verify that I have reviewed the patient's history and helped formulate the treatment plan and discussed it with the physician's assistant.  I have actively participated  in the evaluation and treatment plan for this patient visit.  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  Therefore is not interested in surgery at this time.  I told him that we could probably improve his symptoms with a simple laminotomy decompression and diskectomy surgery.  The patient is tempted but again cannot stay way from lifting heavy objects for an extended period of time postoperatively.  Therefore recommend that he follow-up with Dr. Jackson as scheduled for further epidural steroid injections and exhaust that mode of treatment completely.  Follow-up in 2 months or sooner if needed.  Prescription for Robaxin per the patient and his wife's request.    Treatment options were discussed with regards to the nature of the medical condition. Conservative pain intervention and surgical options were discussed in detail. The probability of success of each separate treatment option was discussed. The patient expressed a clear understanding of the treatment options. With regards to surgery, the procedure risk, benefits, complications, and outcomes were  discussed. No guarantees were given with regards to surgical outcome.   The risk of complications, morbidity, and mortality of patient management decisions have been made at the time of this visit. These are associated with the patient's problems, diagnostic procedures and treatment options. This includes the possible management options selected and those considered but not selected by the patient after shared medical decision making we discussed with the patient.     This note was created using Dragon voice recognition software that occasionally misinterpreted phrases or words.

## 2022-10-25 ENCOUNTER — OFFICE VISIT (OUTPATIENT)
Dept: PAIN MEDICINE | Facility: CLINIC | Age: 67
End: 2022-10-25
Payer: MEDICARE

## 2022-10-25 ENCOUNTER — TELEPHONE (OUTPATIENT)
Dept: PAIN MEDICINE | Facility: CLINIC | Age: 67
End: 2022-10-25
Payer: MEDICARE

## 2022-10-25 VITALS
WEIGHT: 315 LBS | SYSTOLIC BLOOD PRESSURE: 140 MMHG | BODY MASS INDEX: 41.75 KG/M2 | RESPIRATION RATE: 16 BRPM | HEART RATE: 85 BPM | HEIGHT: 73 IN | DIASTOLIC BLOOD PRESSURE: 88 MMHG

## 2022-10-25 DIAGNOSIS — M51.36 DDD (DEGENERATIVE DISC DISEASE), LUMBAR: ICD-10-CM

## 2022-10-25 DIAGNOSIS — M47.896 OTHER SPONDYLOSIS, LUMBAR REGION: ICD-10-CM

## 2022-10-25 DIAGNOSIS — M54.16 CHRONIC LUMBAR RADICULOPATHY: Primary | ICD-10-CM

## 2022-10-25 PROCEDURE — 3288F PR FALLS RISK ASSESSMENT DOCUMENTED: ICD-10-PCS | Mod: CPTII,S$GLB,, | Performed by: ANESTHESIOLOGY

## 2022-10-25 PROCEDURE — 3077F SYST BP >= 140 MM HG: CPT | Mod: CPTII,S$GLB,, | Performed by: ANESTHESIOLOGY

## 2022-10-25 PROCEDURE — 99214 OFFICE O/P EST MOD 30 MIN: CPT | Mod: S$GLB,,, | Performed by: ANESTHESIOLOGY

## 2022-10-25 PROCEDURE — 99214 PR OFFICE/OUTPT VISIT, EST, LEVL IV, 30-39 MIN: ICD-10-PCS | Mod: S$GLB,,, | Performed by: ANESTHESIOLOGY

## 2022-10-25 PROCEDURE — 3079F PR MOST RECENT DIASTOLIC BLOOD PRESSURE 80-89 MM HG: ICD-10-PCS | Mod: CPTII,S$GLB,, | Performed by: ANESTHESIOLOGY

## 2022-10-25 PROCEDURE — 99999 PR PBB SHADOW E&M-EST. PATIENT-LVL III: CPT | Mod: PBBFAC,,, | Performed by: ANESTHESIOLOGY

## 2022-10-25 PROCEDURE — 1126F AMNT PAIN NOTED NONE PRSNT: CPT | Mod: CPTII,S$GLB,, | Performed by: ANESTHESIOLOGY

## 2022-10-25 PROCEDURE — 1159F MED LIST DOCD IN RCRD: CPT | Mod: CPTII,S$GLB,, | Performed by: ANESTHESIOLOGY

## 2022-10-25 PROCEDURE — 3079F DIAST BP 80-89 MM HG: CPT | Mod: CPTII,S$GLB,, | Performed by: ANESTHESIOLOGY

## 2022-10-25 PROCEDURE — 99999 PR PBB SHADOW E&M-EST. PATIENT-LVL III: ICD-10-PCS | Mod: PBBFAC,,, | Performed by: ANESTHESIOLOGY

## 2022-10-25 PROCEDURE — 1101F PR PT FALLS ASSESS DOC 0-1 FALLS W/OUT INJ PAST YR: ICD-10-PCS | Mod: CPTII,S$GLB,, | Performed by: ANESTHESIOLOGY

## 2022-10-25 PROCEDURE — 1101F PT FALLS ASSESS-DOCD LE1/YR: CPT | Mod: CPTII,S$GLB,, | Performed by: ANESTHESIOLOGY

## 2022-10-25 PROCEDURE — 3288F FALL RISK ASSESSMENT DOCD: CPT | Mod: CPTII,S$GLB,, | Performed by: ANESTHESIOLOGY

## 2022-10-25 PROCEDURE — 1126F PR PAIN SEVERITY QUANTIFIED, NO PAIN PRESENT: ICD-10-PCS | Mod: CPTII,S$GLB,, | Performed by: ANESTHESIOLOGY

## 2022-10-25 PROCEDURE — 1159F PR MEDICATION LIST DOCUMENTED IN MEDICAL RECORD: ICD-10-PCS | Mod: CPTII,S$GLB,, | Performed by: ANESTHESIOLOGY

## 2022-10-25 PROCEDURE — 3077F PR MOST RECENT SYSTOLIC BLOOD PRESSURE >= 140 MM HG: ICD-10-PCS | Mod: CPTII,S$GLB,, | Performed by: ANESTHESIOLOGY

## 2022-10-25 RX ORDER — PREGABALIN 150 MG/1
CAPSULE ORAL
Qty: 60 CAPSULE | Refills: 0 | Status: ON HOLD | OUTPATIENT
Start: 2022-10-25 | End: 2023-01-11 | Stop reason: HOSPADM

## 2022-10-25 NOTE — TELEPHONE ENCOUNTER
----- Message from Yuly Anaya sent at 10/25/2022 10:05 AM CDT -----  Contact: [phone]  Type:  Sooner Appointment Request    Caller is requesting a sooner appointment.  Caller declined first available appointment listed below.  Caller will not accept being placed on the waitlist and is requesting a message be sent to doctor.    Name of Caller:  Pts wife   When is the first available appointment?  Nov   Symptoms:  Back pain   Best Call Back Number:  [phone]    Additional Information:  Pts wife asking for latest appt in the day.

## 2022-10-25 NOTE — H&P (VIEW-ONLY)
FOLLOW UP NOTE:     CHIEF COMPLAINT: back and leg pain    INITIAL HISTORY OF PRESENT ILLNESS: Matt Martinez is a 67 y.o. male with PMH significant for HTN and obesity presents as a referral for the evaluation of back and leg pain. The patient reports that his pain began approximately Christmas 2021 after no inciting incident or trauma. The patient denies of improvement since onset. The patient localizes his worst pain to the posterolateral aspect of his right calf. The patient believes his calf and back pain are related. The patient denies of radiation into his right foot. The patient describes his pain as a sharp type of pain. The patient denies of numbness. The patient reports that his pain is a 0/10 as he is currently sitting. The patient reports that his pain increases to a 9/10 at its worst. Patient denies of any urinary/fecal incontinence, saddle anesthesia, or weakness.       Aggravating factors: standing up straight     Mitigating factors: leaning forward and putting his hands on his knees     Relevant Surgeries: no     Interventional Therapies: yes; knee injection via Dr. Mccarty - denies of any benefit.     INTERVAL HISTORY OF PRESENT ILLNESS: Matt Martinez is a 67 y.o. male with PMH significant for HTN and obesity presents as an established patient for the continued management of back and leg pain. The patient is s/p right L3-L4, L4-L5, L5-S1 transforaminal epidural steroid injection on 9/23/2022, and he reports of a week and a half of significant relief. Today, the patient reports of intermittent pain in the area across his lower back. The patient continues to report of severe pain at the posterolateral aspect of his right calf. The patient denies of associated numbness. The patient reports of worsened pain with prolonged walking. The patient reports of improved pain with rest. The patient denies of any significant changes in his health since his last appointment. The patient also denies of any changes in  "the character of his pain since his last appointment. The patient reports that his pain is a 10/10 at its worst. Patient denies of any urinary/fecal incontinence, saddle anesthesia, or weakness.     INTERVENTIONAL PAIN HISTORY:  9/23/2022: Right L3-L4, L4-L5, L5-S1 transforaminal epidural steroid injection - one week and half of significant relief    CURRENT PAIN MEDICATIONS:     Previously tried:  Robaxin - denies of benefit      ROS:  Review of Systems   Constitutional:  Negative for chills and fever.   HENT:  Negative for tinnitus.    Eyes:  Negative for visual disturbance.   Respiratory:  Negative for shortness of breath.    Cardiovascular:  Negative for chest pain.   Gastrointestinal:  Negative for nausea and vomiting.   Genitourinary:  Negative for difficulty urinating.   Musculoskeletal:  Positive for back pain.   Skin:  Negative for rash.   Allergic/Immunologic: Negative for immunocompromised state.   Neurological:  Negative for syncope.   Hematological:  Does not bruise/bleed easily.   Psychiatric/Behavioral:  Negative for suicidal ideas.       MEDICAL, SURGICAL, FAMILY, SOCIAL HX: reviewed    MEDICATIONS/ALLERGIES: reviewed    PHYSICAL EXAM:    VITALS: Vitals reviewed.   Vitals:    10/25/22 1513   BP: (!) 140/88   Pulse: 85   Resp: 16   Weight: (!) 170.1 kg (375 lb)   Height: 6' 1" (1.854 m)   PainSc: 0-No pain       Physical Exam  Vitals and nursing note reviewed.   Constitutional:       Appearance: Normal appearance. He is not toxic-appearing or diaphoretic.   HENT:      Head: Normocephalic and atraumatic.   Eyes:      General:         Right eye: No discharge.         Left eye: No discharge.      Extraocular Movements: Extraocular movements intact.      Conjunctiva/sclera: Conjunctivae normal.   Cardiovascular:      Rate and Rhythm: Normal rate.   Pulmonary:      Effort: Pulmonary effort is normal. No respiratory distress.      Breath sounds: Normal breath sounds.   Abdominal:      Palpations: Abdomen is " soft.   Skin:     General: Skin is warm and dry.      Findings: No rash.   Neurological:      Mental Status: He is alert and oriented to person, place, and time.   Psychiatric:         Mood and Affect: Mood and affect normal.         Cognition and Memory: Memory normal.         Judgment: Judgment normal.        UPPER EXTREMITIES: Normal alignment, normal range of motion, no atrophy, no skin changes,  hair growth and nail growth normal and equal bilaterally. No swelling, no tenderness.    LOWER EXTREMITIES:  Normal alignment, normal range of motion, no atrophy, no skin changes,  hair growth and nail growth normal and equal bilaterally. No swelling, no tenderness.     LUMBAR SPINE  Lumbar spine: ROM is full with flexion but limited with extension and oblique extension with increased pain with extension but relief with flexion.    ((+)) Supine straight leg raise on the right   ((+)) Facet loading   Internal and external rotation of the hip causes no increased pain on either side.  Myofascial exam: No tenderness to palpation across lumbar paraspinous muscles.     MOTOR: Tone and bulk: normal      MUSCLE STRENGTH:  Hip Flexion: Right 5/5, Left 5/5  Hip Extension: Right 5/5, Left 5/5  Leg Flexion: Right 5/5, Left 5/5  Leg Extension: Right 5/5, Left 5/5  Plantar Flexion: Right 5/5, Left 5/5  Dorsiflexion: Right 5/5, Left 5/5     Delt      Bi         Tri                         Right:   5/5       5/5       5/5       5/5         Left:     5/5       5/5       5/5       5/5            SENSATION: Light touch and pinprick intact bilaterally  REFLEXES: normal, symmetric, nonbrisk.  Toes down, no clonus. Negative beyer's sign bilaterally.  GAIT: normal rise, base, steps, and arm swing.         IMAGING: no new imaging to review         ASSESSMENT: Matt Martinez is a 67 y.o. male with PMH significant for HTN and obesity presents as an established patient for the continued management of back and leg pain. The patient is s/p  right L3-L4, L4-L5, L5-S1 transforaminal epidural steroid injection on 9/23/2022, and he reports of a week and a half of significant relief. Today, the patient reports of intermittent pain in the area across his lower back. The patient continues to report of severe pain at the posterolateral aspect of his right calf. Patient reports that he pain was reproduced while the L3-L4 level was being treated during his last BECCA. Treatment plan outlined below.     PLAN:  Schedule for right L3-L4 transforaminal epidural steroid injection  I have stressed the importance of physical activity and a home exercise plan to help with chronic pain and improve health.  Prescribed Lyrica 150 mg PO QHS for neuropathic pain  Continue OTC Tylenol for pain  RTC for the procedure as outlined above     Mara Jackson MD  Pain Management

## 2022-10-26 ENCOUNTER — TELEPHONE (OUTPATIENT)
Dept: PAIN MEDICINE | Facility: CLINIC | Age: 67
End: 2022-10-26
Payer: MEDICARE

## 2022-10-26 ENCOUNTER — PATIENT MESSAGE (OUTPATIENT)
Dept: PAIN MEDICINE | Facility: CLINIC | Age: 67
End: 2022-10-26
Payer: MEDICARE

## 2022-10-26 DIAGNOSIS — M54.16 CHRONIC LUMBAR RADICULOPATHY: Primary | ICD-10-CM

## 2022-10-26 NOTE — TELEPHONE ENCOUNTER
Cmt: right              L3-L4 transforaminal epidural steroid injection      Order Specific Information

## 2022-11-21 ENCOUNTER — HOSPITAL ENCOUNTER (OUTPATIENT)
Facility: AMBULARY SURGERY CENTER | Age: 67
Discharge: HOME | End: 2022-11-21
Attending: ANESTHESIOLOGY | Admitting: ANESTHESIOLOGY
Payer: MEDICARE

## 2022-11-21 DIAGNOSIS — M54.16 LUMBAR RADICULOPATHY: Primary | ICD-10-CM

## 2022-11-21 PROCEDURE — 64483 NJX AA&/STRD TFRM EPI L/S 1: CPT | Mod: RT,,, | Performed by: ANESTHESIOLOGY

## 2022-11-21 PROCEDURE — 64483 PR EPIDURAL INJ, ANES/STEROID, TRANSFORAMINAL, LUMB/SACR, SNGL LEVL: ICD-10-PCS | Mod: RT,,, | Performed by: ANESTHESIOLOGY

## 2022-11-21 PROCEDURE — 64483 NJX AA&/STRD TFRM EPI L/S 1: CPT | Mod: RT | Performed by: ANESTHESIOLOGY

## 2022-11-21 RX ORDER — FENTANYL CITRATE 50 UG/ML
INJECTION, SOLUTION INTRAMUSCULAR; INTRAVENOUS
Status: DISCONTINUED | OUTPATIENT
Start: 2022-11-21 | End: 2022-11-21 | Stop reason: HOSPADM

## 2022-11-21 RX ORDER — LIDOCAINE HYDROCHLORIDE 10 MG/ML
INJECTION, SOLUTION EPIDURAL; INFILTRATION; INTRACAUDAL; PERINEURAL
Status: DISCONTINUED | OUTPATIENT
Start: 2022-11-21 | End: 2022-11-21 | Stop reason: HOSPADM

## 2022-11-21 RX ORDER — DEXAMETHASONE SODIUM PHOSPHATE 10 MG/ML
INJECTION INTRAMUSCULAR; INTRAVENOUS
Status: DISCONTINUED | OUTPATIENT
Start: 2022-11-21 | End: 2022-11-21 | Stop reason: HOSPADM

## 2022-11-21 RX ORDER — SODIUM CHLORIDE, SODIUM LACTATE, POTASSIUM CHLORIDE, CALCIUM CHLORIDE 600; 310; 30; 20 MG/100ML; MG/100ML; MG/100ML; MG/100ML
INJECTION, SOLUTION INTRAVENOUS ONCE AS NEEDED
Status: COMPLETED | OUTPATIENT
Start: 2022-11-21 | End: 2022-11-21

## 2022-11-21 RX ORDER — BUPIVACAINE HYDROCHLORIDE 2.5 MG/ML
INJECTION, SOLUTION EPIDURAL; INFILTRATION; INTRACAUDAL
Status: DISCONTINUED | OUTPATIENT
Start: 2022-11-21 | End: 2022-11-21 | Stop reason: HOSPADM

## 2022-11-21 RX ORDER — MIDAZOLAM HYDROCHLORIDE 1 MG/ML
INJECTION INTRAMUSCULAR; INTRAVENOUS
Status: DISCONTINUED | OUTPATIENT
Start: 2022-11-21 | End: 2022-11-21 | Stop reason: HOSPADM

## 2022-11-21 RX ADMIN — SODIUM CHLORIDE, SODIUM LACTATE, POTASSIUM CHLORIDE, CALCIUM CHLORIDE: 600; 310; 30; 20 INJECTION, SOLUTION INTRAVENOUS at 09:11

## 2022-11-21 NOTE — DISCHARGE SUMMARY
[facility]  Discharge Note  Short Stay    Procedure(s) (LRB):  INJECTION, STEROID, EPIDURAL, TRANSFORAMINAL APPROACH  RIGHT L3-4 (Right)      OUTCOME: Patient tolerated treatment/procedure well without complication and is now ready for discharge.    DISPOSITION: Home or Self Care    FINAL DIAGNOSIS:  Lumbar radiculopathy    FOLLOWUP: In clinic    DISCHARGE INSTRUCTIONS:    Discharge Procedure Orders   Diet general     Call MD for:  temperature >100.4     Call MD for:  persistent nausea and vomiting     Call MD for:  severe uncontrolled pain     Call MD for:  difficulty breathing, headache or visual disturbances     Call MD for:  redness, tenderness, or signs of infection (pain, swelling, redness, odor or green/yellow discharge around incision site)     Call MD for:  hives     Call MD for:  persistent dizziness or light-headedness     Call MD for:  extreme fatigue        TIME SPENT ON DISCHARGE: 30 minutes

## 2022-11-21 NOTE — PLAN OF CARE
Discharge instructions given to pt/wife, verbalized understanding.  Tolerating PO fluids.  IV removed.  Slight c/o pain to right leg.  No c/o weakness.  Ambulating out with wife in no distress.

## 2022-11-21 NOTE — OP NOTE
PROCEDURE DATE: 11/21/2022    PROCEDURE: Right L3-L4 transforaminal epidural steroid injection under fluoroscopy    DIAGNOSIS: M51.16 Intervertebral disc disorders with radiculopathy, lumbar region  Post op diagnosis: Same    PHYSICIAN: Mara Jackson MD    MEDICATIONS INJECTED:  Dexamethasone 10 mg (1 ml) and 3 ml 0.25% bupivicaine at each nerve root.     LOCAL ANESTHETIC INJECTED:  Lidocaine 1%. 4 ml per site.    SEDATION MEDICATIONS: RN IV sedation    ESTIMATED BLOOD LOSS:  none    COMPLICATIONS:  none    TECHNIQUE:   A time-out was taken to identify patient and procedure side prior to starting the procedure. The patient was placed in a prone position, prepped and draped in the usual sterile fashion using ChloraPrep and sterile towels.  The area to be injected was determined under fluoroscopic guidance in AP and oblique view.  Local anesthetic was given by raising a wheal and going down to the hub of a 25-gauge 1.5 inch needle.  In oblique view, a 5 inch 22-gauge bent-tip spinal needle was introduced towards 6 oclock position of the pedicle of each above named nerve root level.  The needle was walked medially then hinged into the neural foramen and position was confirmed in AP and lateral views.  3 mL contrast dye was injected to confirm appropriate placement and that there was no vascular uptake.  After negative aspiration for blood or CSF, the medication was then injected. This was performed at the L3-L4 level(s). The patient tolerated the procedure well.    The patient was monitored after the procedure.  Patient was given post procedure and discharge instructions to follow at home. The patient was discharged in a stable condition.

## 2022-11-22 VITALS
HEIGHT: 73 IN | WEIGHT: 315 LBS | SYSTOLIC BLOOD PRESSURE: 134 MMHG | TEMPERATURE: 97 F | OXYGEN SATURATION: 96 % | HEART RATE: 78 BPM | DIASTOLIC BLOOD PRESSURE: 71 MMHG | BODY MASS INDEX: 41.75 KG/M2 | RESPIRATION RATE: 18 BRPM

## 2022-12-19 ENCOUNTER — OFFICE VISIT (OUTPATIENT)
Dept: ORTHOPEDICS | Facility: CLINIC | Age: 67
End: 2022-12-19
Payer: MEDICARE

## 2022-12-19 VITALS — HEIGHT: 73 IN | WEIGHT: 315 LBS | BODY MASS INDEX: 41.75 KG/M2

## 2022-12-19 DIAGNOSIS — M48.061 LUMBAR FORAMINAL STENOSIS: Primary | ICD-10-CM

## 2022-12-19 DIAGNOSIS — M51.26 PROTRUSION OF LUMBAR INTERVERTEBRAL DISC: ICD-10-CM

## 2022-12-19 DIAGNOSIS — M51.36 LUMBAR DEGENERATIVE DISC DISEASE: ICD-10-CM

## 2022-12-19 DIAGNOSIS — M47.816 FACET ARTHRITIS OF LUMBAR REGION: ICD-10-CM

## 2022-12-19 PROCEDURE — 1125F AMNT PAIN NOTED PAIN PRSNT: CPT | Mod: CPTII,S$GLB,, | Performed by: ORTHOPAEDIC SURGERY

## 2022-12-19 PROCEDURE — 1125F PR PAIN SEVERITY QUANTIFIED, PAIN PRESENT: ICD-10-PCS | Mod: CPTII,S$GLB,, | Performed by: ORTHOPAEDIC SURGERY

## 2022-12-19 PROCEDURE — 99213 PR OFFICE/OUTPT VISIT, EST, LEVL III, 20-29 MIN: ICD-10-PCS | Mod: S$GLB,,, | Performed by: ORTHOPAEDIC SURGERY

## 2022-12-19 PROCEDURE — 1101F PT FALLS ASSESS-DOCD LE1/YR: CPT | Mod: CPTII,S$GLB,, | Performed by: ORTHOPAEDIC SURGERY

## 2022-12-19 PROCEDURE — 1101F PR PT FALLS ASSESS DOC 0-1 FALLS W/OUT INJ PAST YR: ICD-10-PCS | Mod: CPTII,S$GLB,, | Performed by: ORTHOPAEDIC SURGERY

## 2022-12-19 PROCEDURE — 3008F PR BODY MASS INDEX (BMI) DOCUMENTED: ICD-10-PCS | Mod: CPTII,S$GLB,, | Performed by: ORTHOPAEDIC SURGERY

## 2022-12-19 PROCEDURE — 99213 OFFICE O/P EST LOW 20 MIN: CPT | Mod: S$GLB,,, | Performed by: ORTHOPAEDIC SURGERY

## 2022-12-19 PROCEDURE — 1159F PR MEDICATION LIST DOCUMENTED IN MEDICAL RECORD: ICD-10-PCS | Mod: CPTII,S$GLB,, | Performed by: ORTHOPAEDIC SURGERY

## 2022-12-19 PROCEDURE — 1160F PR REVIEW ALL MEDS BY PRESCRIBER/CLIN PHARMACIST DOCUMENTED: ICD-10-PCS | Mod: CPTII,S$GLB,, | Performed by: ORTHOPAEDIC SURGERY

## 2022-12-19 PROCEDURE — 3288F FALL RISK ASSESSMENT DOCD: CPT | Mod: CPTII,S$GLB,, | Performed by: ORTHOPAEDIC SURGERY

## 2022-12-19 PROCEDURE — 3008F BODY MASS INDEX DOCD: CPT | Mod: CPTII,S$GLB,, | Performed by: ORTHOPAEDIC SURGERY

## 2022-12-19 PROCEDURE — 3288F PR FALLS RISK ASSESSMENT DOCUMENTED: ICD-10-PCS | Mod: CPTII,S$GLB,, | Performed by: ORTHOPAEDIC SURGERY

## 2022-12-19 PROCEDURE — 1160F RVW MEDS BY RX/DR IN RCRD: CPT | Mod: CPTII,S$GLB,, | Performed by: ORTHOPAEDIC SURGERY

## 2022-12-19 PROCEDURE — 1159F MED LIST DOCD IN RCRD: CPT | Mod: CPTII,S$GLB,, | Performed by: ORTHOPAEDIC SURGERY

## 2022-12-19 RX ORDER — GABAPENTIN 300 MG/1
300 CAPSULE ORAL 3 TIMES DAILY
Status: ON HOLD | COMMUNITY
End: 2023-01-11 | Stop reason: HOSPADM

## 2022-12-19 NOTE — H&P (VIEW-ONLY)
Subjective:       Patient ID: Matt Martinez is a 67 y.o. male.    Chief Complaint: Pain of the Lumbar Spine (lumbar TFESI W/Jackson injection helped about 4 days, pain is little worse, pain down right leg to calf, numbness at night)      History of Present Illness    Prior to meeting with the patient I reviewed the medical chart in [facility]. This included reviewing the previous progress notes from our office, review of the patient's last appointment with their primary care provider, review of any visits to the emergency room, and review of any pain management appointments or procedures.   Patient has had 2 prior epidural steroid injections on the right side with only temporary partial relief of his symptoms.  At best they lasted 10 days.  He has pain in his right calf with walking.  Thus he has had 2 epidural steroid injections and no long-term relief.  He is trying to avoid surgery because it to his job.    Current Medications  Current Outpatient Medications   Medication Sig Dispense Refill    amlodipine (NORVASC) 10 MG tablet Take 15 mg by mouth once daily.      gabapentin (NEURONTIN) 300 MG capsule Take 300 mg by mouth 3 (three) times daily.      hydrochlorothiazide (HYDRODIURIL) 25 MG tablet Take 25 mg by mouth once daily.      losartan (COZAAR) 50 MG tablet Take 50 mg by mouth 2 (two) times daily.      methocarbamoL (ROBAXIN) 500 MG Tab Take 1 tablet (500 mg total) by mouth 4 (four) times daily. 120 tablet 2    pregabalin (LYRICA) 150 MG capsule Take one tablet by mouth nightly. Can increase to two tablets by mouth nightly if no benefit with one tablet dosing 60 capsule 0     No current facility-administered medications for this visit.     Facility-Administered Medications Ordered in Other Visits   Medication Dose Route Frequency Provider Last Rate Last Admin    lactated ringers infusion   Intravenous Once PRN Mara Jackson MD           Allergies  Review of patient's allergies indicates:   Allergen Reactions     "Simvastatin Other (See Comments)     WEAKNESS       Past Medical History  Past Medical History:   Diagnosis Date    Cancer     SQUAMOUS CELL CARCINOMA R FA    Hepatitis a without hepatic coma     Hepatitis a without hepatic coma     Hep A during [place]....."Ate monkey meat."    HTN (hypertension)        Surgical History  Past Surgical History:   Procedure Laterality Date    COLONOSCOPY  2011    colon polyps removed    COLONOSCOPY N/A 01/09/2017    Procedure: COLONOSCOPY;  Surgeon: Alexei Claudio MD;  Location: [facility] ENDO;  Service: Endoscopy;  Laterality: N/A;    TONSILLECTOMY      TRANSFORAMINAL EPIDURAL INJECTION OF STEROID Right 9/23/2022    Procedure: Injection,steroid,epidural,transforaminal approach;  Surgeon: Mara Jackson MD;  Location: [facility] OR;  Service: Pain Management;  Laterality: Right;  L3-L4, L4-L5, L5-S1    TRANSFORAMINAL EPIDURAL INJECTION OF STEROID Right 11/21/2022    Procedure: INJECTION, STEROID, EPIDURAL, TRANSFORAMINAL APPROACH  RIGHT L3-4;  Surgeon: Mara Jackson MD;  Location: [facility] OR;  Service: Pain Management;  Laterality: Right;       Family History:   Family History   Problem Relation Age of Onset    Pancreatic cancer Mother     Colon cancer Brother         unsure of age of diagnosis    Crohn's disease Neg Hx     Ulcerative colitis Neg Hx        Social History:   Social History     Socioeconomic History    Marital status:    Tobacco Use    Smoking status: Never    Smokeless tobacco: Never   Substance and Sexual Activity    Alcohol use: Yes     Comment: socially    Drug use: No    Sexual activity: Yes     Partners: Female       Hospitalization/Major Diagnostic Procedure:     Review of Systems     General/Constitutional:  Chills denies. Fatigue denies. Fever denies. Weight gain denies. Weight loss denies.    Respiratory:  Shortness of breath denies.    Cardiovascular:  Chest pain denies.    Gastrointestinal:  Constipation denies. Diarrhea denies. Nausea denies. Vomiting " denies.     Hematology:  Easy bruising denies. Prolonged bleeding denies.     Genitourinary:  Frequent urination denies. Pain in lower back denies. Painful urination denies.     Musculoskeletal:  See HPI for details    Skin:  Rash denies.    Neurologic:  Dizziness denies. Gait abnormalities denies. Seizures denies. Tingling/Numbess denies.    Psychiatric:  Anxiety denies. Depressed mood denies.     Objective:   Vital Signs: There were no vitals filed for this visit.     Physical Exam      General Examination:     Constitutional: The patient is alert and oriented to lace person and time. Mood is pleasant.     Head/Face: Normal facial features normal eyebrows    Eyes: Normal extraocular motion bilaterally    Lungs: Respirations are equal and unlabored    Gait is coordinated.    Cardiovascular: There are no swelling or varicosities present.    Lymphatic: Negative for adenopathy    Skin: Normal    Neurological: Level of consciousness normal. Oriented to place person and time and situation    Psychiatric: Oriented to time place person and situation    Straight leg raising is positive on the right side positive bowstring test    XRAY Report/ Interpretation :  No new x-rays today results of previous MRI discussed      Assessment:       1. Lumbar foraminal stenosis    2. Lumbar degenerative disc disease    3. Facet arthritis of lumbar region    4. Protrusion of lumbar intervertebral disc          Plan:       Matt was seen today for pain.    Diagnoses and all orders for this visit:    Lumbar foraminal stenosis    Lumbar degenerative disc disease    Facet arthritis of lumbar region    Protrusion of lumbar intervertebral disc         No follow-ups on file.    Since he has not responded to epidural steroid injections on the right side and since he is attempting to avoid surgery I would refer him to pain management for evaluation for right-sided medial branch blocks and possible rhizotomy should this fail he will have to live  with his symptoms are have a right level multilevel decompression on the right L3-4 L4-5 and L5-S1 levels      Treatment options were discussed with regards to the nature of the medical condition. Conservative pain intervention and surgical options were discussed in detail. The probability of success of each separate treatment option was discussed. The patient expressed a clear understanding of the treatment options. With regards to surgery, the procedure risk, benefits, complications, and outcomes were discussed. No guarantees were given with regards to surgical outcome.   The risk of complications, morbidity, and mortality of patient management decisions have been made at the time of this visit. These are associated with the patient's problems, diagnostic procedures and treatment options. This includes the possible management options selected and those considered but not selected by the patient after shared medical decision making we discussed with the patient.   This note was created using Dragon voice recognition software that occasionally misinterpreted phrases or words.

## 2022-12-19 NOTE — PATIENT INSTRUCTIONS
[facility]  [facility]      STEP 1: Diagnostic Medial Branch Blocks (Facet Nerve Blocks)    What are medial branch nerves? Why are medial branch blocks helpful?     Medial branch nerves are very small nerve branches that communicate pain caused by the facet joints in the spine. These nerves do not control any muscles or sensation in the arms or legs. They are located along a bony groove in the spine.     If this procedure has been scheduled, there is strong evidence to suspect that the facet joints are the source of your pain. Benefit may be obtained from having these medial branch nerves temporarily blocked with an anesthetic to see if a more permanent way of blocking these nerves would provide pain relief for a longer term. Blocking these medial branch nerves temporarily stops the transmission of pain signals from the facet joints to the brain. If you receive temporary relief of a portion of your pain after these injections you will likely benefit from radiofrequency ablation of the same nerves. Radiofrequency ablation provides the same amount of relief as the diagnostic blocks, but lasts approximately 6-12 months.     What happens during medial branch blocks?    An IV will be started, so that sedation can be given. You will be placed on the x-ray table and positioned in such a way that the physician can best visualize the bony areas where the medial branch nerves pass. The skin is cleansed using sterile scrub (soap). Next the physician numbs a small area of skin with numbing medicine. The medicine stings for several seconds. Using x-ray guidance, your physician directs a small needle, near the specific nerve or nerves being tested. A small mixture of numbing medicine (anesthetic) and anti-inflammatory (cortisone/steroid) is then injected.    What happens after the procedure?  Immediately after the procedure, you will go back to the recovery area where you will be monitored for  30-60 minutes. You will be asked to report the immediate percentage of pain relief and record the relief experienced during that day on a post injection evaluation sheet (pain diary). You must call and set up a follow up appointment for two weeks after the procedure to discuss the results from this block and determine if you will proceed to step 2 of the treatment of your facet nerves.

## 2022-12-19 NOTE — PROGRESS NOTES
Subjective:       Patient ID: Matt Martinez is a 67 y.o. male.    Chief Complaint: Pain of the Lumbar Spine (lumbar TFESI W/Jackson injection helped about 4 days, pain is little worse, pain down right leg to calf, numbness at night)      History of Present Illness    Prior to meeting with the patient I reviewed the medical chart in [facility]. This included reviewing the previous progress notes from our office, review of the patient's last appointment with their primary care provider, review of any visits to the emergency room, and review of any pain management appointments or procedures.   Patient has had 2 prior epidural steroid injections on the right side with only temporary partial relief of his symptoms.  At best they lasted 10 days.  He has pain in his right calf with walking.  Thus he has had 2 epidural steroid injections and no long-term relief.  He is trying to avoid surgery because it to his job.    Current Medications  Current Outpatient Medications   Medication Sig Dispense Refill    amlodipine (NORVASC) 10 MG tablet Take 15 mg by mouth once daily.      gabapentin (NEURONTIN) 300 MG capsule Take 300 mg by mouth 3 (three) times daily.      hydrochlorothiazide (HYDRODIURIL) 25 MG tablet Take 25 mg by mouth once daily.      losartan (COZAAR) 50 MG tablet Take 50 mg by mouth 2 (two) times daily.      methocarbamoL (ROBAXIN) 500 MG Tab Take 1 tablet (500 mg total) by mouth 4 (four) times daily. 120 tablet 2    pregabalin (LYRICA) 150 MG capsule Take one tablet by mouth nightly. Can increase to two tablets by mouth nightly if no benefit with one tablet dosing 60 capsule 0     No current facility-administered medications for this visit.     Facility-Administered Medications Ordered in Other Visits   Medication Dose Route Frequency Provider Last Rate Last Admin    lactated ringers infusion   Intravenous Once PRN Mara Jackson MD           Allergies  Review of patient's allergies indicates:   Allergen Reactions     "Simvastatin Other (See Comments)     WEAKNESS       Past Medical History  Past Medical History:   Diagnosis Date    Cancer     SQUAMOUS CELL CARCINOMA R FA    Hepatitis a without hepatic coma     Hepatitis a without hepatic coma     Hep A during [place]....."Ate monkey meat."    HTN (hypertension)        Surgical History  Past Surgical History:   Procedure Laterality Date    COLONOSCOPY  2011    colon polyps removed    COLONOSCOPY N/A 01/09/2017    Procedure: COLONOSCOPY;  Surgeon: Alexei Claudio MD;  Location: [facility] ENDO;  Service: Endoscopy;  Laterality: N/A;    TONSILLECTOMY      TRANSFORAMINAL EPIDURAL INJECTION OF STEROID Right 9/23/2022    Procedure: Injection,steroid,epidural,transforaminal approach;  Surgeon: Mara Jackson MD;  Location: [facility] OR;  Service: Pain Management;  Laterality: Right;  L3-L4, L4-L5, L5-S1    TRANSFORAMINAL EPIDURAL INJECTION OF STEROID Right 11/21/2022    Procedure: INJECTION, STEROID, EPIDURAL, TRANSFORAMINAL APPROACH  RIGHT L3-4;  Surgeon: Mara Jackson MD;  Location: [facility] OR;  Service: Pain Management;  Laterality: Right;       Family History:   Family History   Problem Relation Age of Onset    Pancreatic cancer Mother     Colon cancer Brother         unsure of age of diagnosis    Crohn's disease Neg Hx     Ulcerative colitis Neg Hx        Social History:   Social History     Socioeconomic History    Marital status:    Tobacco Use    Smoking status: Never    Smokeless tobacco: Never   Substance and Sexual Activity    Alcohol use: Yes     Comment: socially    Drug use: No    Sexual activity: Yes     Partners: Female       Hospitalization/Major Diagnostic Procedure:     Review of Systems     General/Constitutional:  Chills denies. Fatigue denies. Fever denies. Weight gain denies. Weight loss denies.    Respiratory:  Shortness of breath denies.    Cardiovascular:  Chest pain denies.    Gastrointestinal:  Constipation denies. Diarrhea denies. Nausea denies. Vomiting " denies.     Hematology:  Easy bruising denies. Prolonged bleeding denies.     Genitourinary:  Frequent urination denies. Pain in lower back denies. Painful urination denies.     Musculoskeletal:  See HPI for details    Skin:  Rash denies.    Neurologic:  Dizziness denies. Gait abnormalities denies. Seizures denies. Tingling/Numbess denies.    Psychiatric:  Anxiety denies. Depressed mood denies.     Objective:   Vital Signs: There were no vitals filed for this visit.     Physical Exam      General Examination:     Constitutional: The patient is alert and oriented to lace person and time. Mood is pleasant.     Head/Face: Normal facial features normal eyebrows    Eyes: Normal extraocular motion bilaterally    Lungs: Respirations are equal and unlabored    Gait is coordinated.    Cardiovascular: There are no swelling or varicosities present.    Lymphatic: Negative for adenopathy    Skin: Normal    Neurological: Level of consciousness normal. Oriented to place person and time and situation    Psychiatric: Oriented to time place person and situation    Straight leg raising is positive on the right side positive bowstring test    XRAY Report/ Interpretation :  No new x-rays today results of previous MRI discussed      Assessment:       1. Lumbar foraminal stenosis    2. Lumbar degenerative disc disease    3. Facet arthritis of lumbar region    4. Protrusion of lumbar intervertebral disc          Plan:       Matt was seen today for pain.    Diagnoses and all orders for this visit:    Lumbar foraminal stenosis    Lumbar degenerative disc disease    Facet arthritis of lumbar region    Protrusion of lumbar intervertebral disc         No follow-ups on file.    Since he has not responded to epidural steroid injections on the right side and since he is attempting to avoid surgery I would refer him to pain management for evaluation for right-sided medial branch blocks and possible rhizotomy should this fail he will have to live  with his symptoms are have a right level multilevel decompression on the right L3-4 L4-5 and L5-S1 levels      Treatment options were discussed with regards to the nature of the medical condition. Conservative pain intervention and surgical options were discussed in detail. The probability of success of each separate treatment option was discussed. The patient expressed a clear understanding of the treatment options. With regards to surgery, the procedure risk, benefits, complications, and outcomes were discussed. No guarantees were given with regards to surgical outcome.   The risk of complications, morbidity, and mortality of patient management decisions have been made at the time of this visit. These are associated with the patient's problems, diagnostic procedures and treatment options. This includes the possible management options selected and those considered but not selected by the patient after shared medical decision making we discussed with the patient.   This note was created using Dragon voice recognition software that occasionally misinterpreted phrases or words.

## 2022-12-28 ENCOUNTER — TELEPHONE (OUTPATIENT)
Dept: PAIN MEDICINE | Facility: CLINIC | Age: 67
End: 2022-12-28
Payer: MEDICARE

## 2022-12-28 DIAGNOSIS — M47.896 OTHER SPONDYLOSIS, LUMBAR REGION: Primary | ICD-10-CM

## 2022-12-28 NOTE — TELEPHONE ENCOUNTER
----- Message from Yuly Anaya sent at 12/28/2022 11:02 AM CST -----  Contact: [phone]  Type: Needs Medical Advice  Who Called:  Pts wife Janiya Nash Call Back Number: [phone]    Additional Information: Janiya is calling to schedule pts injections. Pls call back and advise

## 2022-12-28 NOTE — TELEPHONE ENCOUNTER
Provider Comments 12/19/2022  4:11 PM Brock Victor MD Provider Comments -   Note    Eval and Treat Right sided Lumbar Medial Branch Block Injections            Pt wants injections not consult ok to schedule MBB right side?

## 2023-01-11 ENCOUNTER — HOSPITAL ENCOUNTER (OUTPATIENT)
Facility: AMBULARY SURGERY CENTER | Age: 68
Discharge: HOME | End: 2023-01-11
Attending: ANESTHESIOLOGY | Admitting: ANESTHESIOLOGY
Payer: MEDICARE

## 2023-01-11 DIAGNOSIS — M47.896 OTHER SPONDYLOSIS, LUMBAR REGION: Primary | ICD-10-CM

## 2023-01-11 PROCEDURE — 64494 INJ PARAVERT F JNT L/S 2 LEV: CPT | Mod: RT,,, | Performed by: ANESTHESIOLOGY

## 2023-01-11 PROCEDURE — 64493 PR INJ DX/THER AGNT PARAVERT FACET JOINT,IMG GUIDE,LUMBAR/SAC,1ST LVL: ICD-10-PCS | Mod: RT,,, | Performed by: ANESTHESIOLOGY

## 2023-01-11 PROCEDURE — 64493 INJ PARAVERT F JNT L/S 1 LEV: CPT | Mod: RT,,, | Performed by: ANESTHESIOLOGY

## 2023-01-11 PROCEDURE — 64494 INJ PARAVERT F JNT L/S 2 LEV: CPT | Mod: RT | Performed by: ANESTHESIOLOGY

## 2023-01-11 PROCEDURE — 64493 INJ PARAVERT F JNT L/S 1 LEV: CPT | Mod: RT | Performed by: ANESTHESIOLOGY

## 2023-01-11 PROCEDURE — 64494 PR INJ DX/THER AGNT PARAVERT FACET JOINT,IMG GUIDE,LUMBAR/SAC, 2ND LEVEL: ICD-10-PCS | Mod: RT,,, | Performed by: ANESTHESIOLOGY

## 2023-01-11 RX ORDER — BUPIVACAINE HYDROCHLORIDE 5 MG/ML
INJECTION, SOLUTION EPIDURAL; INTRACAUDAL
Status: DISCONTINUED | OUTPATIENT
Start: 2023-01-11 | End: 2023-01-11 | Stop reason: HOSPADM

## 2023-01-11 RX ORDER — SODIUM CHLORIDE, SODIUM LACTATE, POTASSIUM CHLORIDE, CALCIUM CHLORIDE 600; 310; 30; 20 MG/100ML; MG/100ML; MG/100ML; MG/100ML
INJECTION, SOLUTION INTRAVENOUS ONCE AS NEEDED
Status: DISCONTINUED | OUTPATIENT
Start: 2023-01-11 | End: 2023-01-11 | Stop reason: HOSPADM

## 2023-01-11 NOTE — DISCHARGE SUMMARY
[facility]  Discharge Note  Short Stay    Procedure(s) (LRB):  Block-nerve-medial branch-lumbar (Right)      OUTCOME: Patient tolerated treatment/procedure well without complication and is now ready for discharge.    DISPOSITION: Home or Self Care    FINAL DIAGNOSIS:  <principal problem not specified>    FOLLOWUP: In clinic    DISCHARGE INSTRUCTIONS:    Discharge Procedure Orders   Notify your health care provider if you experience any of the following:  temperature >100.4     Notify your health care provider if you experience any of the following:  severe uncontrolled pain     Notify your health care provider if you experience any of the following:  redness, tenderness, or signs of infection (pain, swelling, redness, odor or green/yellow discharge around incision site)     Activity as tolerated        TIME SPENT ON DISCHARGE: 30 minutes

## 2023-01-11 NOTE — PLAN OF CARE
Stable, states ready to go home, brigette po fluids, pain tolerable, ambulated to car with RN to wife , no leg weakness

## 2023-01-11 NOTE — OP NOTE
PROCEDURE DATE: 1/11/2023    PROCEDURE:  Right L3,4,5 medial branch nerve blocks under fluoroscopy (corresponds to right L4/5 and L5/S1 facets)    DIAGNOSIS:  Other lumbar spondylosis    Post Op diagnosis: Same    PHYSICIAN: Matt Ma MD    MEDICATIONS INJECTED: 0.5% bupivicaine, 0.5 ml at each level    SEDATION MEDICATIONS:None    LOCAL ANESTHETIC USED: None    ESTIMATED BLOOD LOSS:  None    COMPLICATIONS:  NOne    TECHNIQUE: A time out was taken to identify the patient, procedure and side of the procedure. The patient was placed in a prone position, then prepped and draped in the usual sterile fashion using ChloraPrep and sterile towels.  The levels were determined under fluoroscopic guidance and then marked.  A 25-gauge 5 inch needle was introduced to the anatomic location of the L3,4,5 medial branch nerves on the right side. The above medication was then injected. The patient tolerated the procedure well.     The patient was monitored after the procedure. Patient was given pain diary to record pain levels at home.     If found to have greater than a 50% recovery and so will be scheduled for a radiofrequency ablation of the corresponding nerves.  Patient was given post procedure and discharge instructions to follow at home.  The patient was discharged in a stable condition.

## 2023-01-12 ENCOUNTER — PATIENT MESSAGE (OUTPATIENT)
Dept: PAIN MEDICINE | Facility: CLINIC | Age: 68
End: 2023-01-12
Payer: MEDICARE

## 2023-01-12 VITALS
RESPIRATION RATE: 20 BRPM | SYSTOLIC BLOOD PRESSURE: 158 MMHG | OXYGEN SATURATION: 96 % | BODY MASS INDEX: 52.33 KG/M2 | DIASTOLIC BLOOD PRESSURE: 82 MMHG | WEIGHT: 315 LBS | TEMPERATURE: 98 F | HEART RATE: 73 BPM

## 2023-01-12 DIAGNOSIS — M47.896 OTHER SPONDYLOSIS, LUMBAR REGION: Primary | ICD-10-CM

## 2023-01-12 NOTE — TELEPHONE ENCOUNTER
Scheduled #2 MBB 01/25/2023. Called and talked to wife and said received about 60-70% relief for a few hours and then pain returned to normal level

## 2023-01-13 ENCOUNTER — TELEPHONE (OUTPATIENT)
Dept: PAIN MEDICINE | Facility: CLINIC | Age: 68
End: 2023-01-13
Payer: MEDICARE

## 2023-01-13 NOTE — TELEPHONE ENCOUNTER
Called and talked to pt himself, he said after got shot he had 80-90% for 21/2-3 hours from Maximum pain. He said it was majority of the time was 60-70% relief. Had talked to wife first time and said the relief % was not correct.

## 2023-01-23 ENCOUNTER — TELEPHONE (OUTPATIENT)
Dept: PAIN MEDICINE | Facility: CLINIC | Age: 68
End: 2023-01-23
Payer: MEDICARE

## 2023-01-23 RX ORDER — SODIUM CHLORIDE, SODIUM LACTATE, POTASSIUM CHLORIDE, CALCIUM CHLORIDE 600; 310; 30; 20 MG/100ML; MG/100ML; MG/100ML; MG/100ML
INJECTION, SOLUTION INTRAVENOUS ONCE AS NEEDED
Status: CANCELLED | OUTPATIENT
Start: 2023-01-23 | End: 2034-06-21

## 2023-01-25 ENCOUNTER — HOSPITAL ENCOUNTER (OUTPATIENT)
Facility: AMBULARY SURGERY CENTER | Age: 68
Discharge: HOME | End: 2023-01-25
Attending: ANESTHESIOLOGY | Admitting: ANESTHESIOLOGY
Payer: MEDICARE

## 2023-01-25 DIAGNOSIS — M47.896 OTHER SPONDYLOSIS, LUMBAR REGION: ICD-10-CM

## 2023-01-25 PROCEDURE — 64635 DESTROY LUMB/SAC FACET JNT: CPT | Mod: RT | Performed by: ANESTHESIOLOGY

## 2023-01-25 PROCEDURE — 64494 INJ PARAVERT F JNT L/S 2 LEV: CPT | Mod: RT | Performed by: ANESTHESIOLOGY

## 2023-01-25 PROCEDURE — 64636 DESTROY L/S FACET JNT ADDL: CPT | Mod: RT,,, | Performed by: ANESTHESIOLOGY

## 2023-01-25 PROCEDURE — 64636 PR DESTROY L/S FACET JNT ADDL: ICD-10-PCS | Mod: RT,,, | Performed by: ANESTHESIOLOGY

## 2023-01-25 PROCEDURE — 64636 DESTROY L/S FACET JNT ADDL: CPT | Mod: RT | Performed by: ANESTHESIOLOGY

## 2023-01-25 PROCEDURE — 64635 DESTROY LUMB/SAC FACET JNT: CPT | Mod: RT,,, | Performed by: ANESTHESIOLOGY

## 2023-01-25 PROCEDURE — 64493 INJ PARAVERT F JNT L/S 1 LEV: CPT | Mod: RT | Performed by: ANESTHESIOLOGY

## 2023-01-25 PROCEDURE — 64635 PR DESTROY LUMB/SAC FACET JNT: ICD-10-PCS | Mod: RT,,, | Performed by: ANESTHESIOLOGY

## 2023-01-25 RX ORDER — BUPIVACAINE HYDROCHLORIDE 5 MG/ML
INJECTION, SOLUTION EPIDURAL; INTRACAUDAL
Status: DISCONTINUED | OUTPATIENT
Start: 2023-01-25 | End: 2023-01-25 | Stop reason: HOSPADM

## 2023-01-25 NOTE — OP NOTE
PROCEDURE DATE: 1/25/2023    PROCEDURE:  Right L3,4,5 medial branch nerve blocks under fluoroscopy (corresponds to right L4/5 and L5/S1 facets)    DIAGNOSIS:  Other lumbar spondylosis    Post Op diagnosis: Same    PHYSICIAN: Matt Ma MD    MEDICATIONS INJECTED: 0.5% bupivicaine, 0.5 ml at each level    SEDATION MEDICATIONS:None    LOCAL ANESTHETIC USED: NOne    ESTIMATED BLOOD LOSS:  NOne    COMPLICATIONS:  NOne    TECHNIQUE: A time out was taken to identify the patient, procedure and side of the procedure. The patient was placed in a prone position, then prepped and draped in the usual sterile fashion using ChloraPrep and sterile towels.  The levels were determined under fluoroscopic guidance and then marked.  A 25-gauge 5 inch needle was introduced to the anatomic location of the L3,4,5 medial branch nerves on the right side. The above medication was then injected. The patient tolerated the procedure well.     The patient was monitored after the procedure. Patient was given pain diary to record pain levels at home.     If found to have greater than a 50% recovery and so will be scheduled for a radiofrequency ablation of the corresponding nerves.  Patient was given post procedure and discharge instructions to follow at home.  The patient was discharged in a stable condition.

## 2023-01-25 NOTE — PLAN OF CARE
Patient sent home with pain diary. He is going home with his wife. They have no additional questions.

## 2023-01-25 NOTE — H&P (VIEW-ONLY)
"CC:  low back pain    HPI: The patient is a 67 y.o. male with a history of low back apin here for MBB. There are no major changes in history and physical from 12/19/22 by Oneyda.    Past Medical History:   Diagnosis Date    Cancer     SQUAMOUS CELL CARCINOMA R FA    Hepatitis a without hepatic coma     Hepatitis a without hepatic coma     Hep A during [place]....."Ate monkey meat."    HTN (hypertension)        Past Surgical History:   Procedure Laterality Date    COLONOSCOPY  2011    colon polyps removed    COLONOSCOPY N/A 01/09/2017    Procedure: COLONOSCOPY;  Surgeon: Alexei Claudio MD;  Location: [facility] ENDO;  Service: Endoscopy;  Laterality: N/A;    INJECTION OF ANESTHETIC AGENT AROUND MEDIAL BRANCH NERVES INNERVATING LUMBAR FACET JOINT Right 1/11/2023    Procedure: Block-nerve-medial branch-lumbar;  Surgeon: Matt Ma MD;  Location: [facility] OR;  Service: Pain Management;  Laterality: Right;  L3,4,5 MBB (oneyda)    TONSILLECTOMY      TRANSFORAMINAL EPIDURAL INJECTION OF STEROID Right 9/23/2022    Procedure: Injection,steroid,epidural,transforaminal approach;  Surgeon: Mara Jackson MD;  Location: [facility] OR;  Service: Pain Management;  Laterality: Right;  L3-L4, L4-L5, L5-S1    TRANSFORAMINAL EPIDURAL INJECTION OF STEROID Right 11/21/2022    Procedure: INJECTION, STEROID, EPIDURAL, TRANSFORAMINAL APPROACH  RIGHT L3-4;  Surgeon: Mara Jackson MD;  Location: [facility] OR;  Service: Pain Management;  Laterality: Right;       Family History   Problem Relation Age of Onset    Pancreatic cancer Mother     Colon cancer Brother         unsure of age of diagnosis    Crohn's disease Neg Hx     Ulcerative colitis Neg Hx        Social History     Socioeconomic History    Marital status:    Tobacco Use    Smoking status: Never    Smokeless tobacco: Never   Substance and Sexual Activity    Alcohol use: Yes     Comment: socially    Drug use: No    Sexual activity: Yes     Partners: Female       Current " "Facility-Administered Medications   Medication Dose Route Frequency Provider Last Rate Last Admin    BUPivacaine (PF) 0.5% (5 mg/mL) injection    PRN Matt Ma MD   5 mL at 01/25/23 1339     Facility-Administered Medications Ordered in Other Encounters   Medication Dose Route Frequency Provider Last Rate Last Admin    lactated ringers infusion   Intravenous Once PRN Mara Jackson MD           Review of patient's allergies indicates:   Allergen Reactions    Simvastatin Other (See Comments)     WEAKNESS       Vitals:    01/19/23 1506 01/25/23 1334   BP:  129/64   Pulse:  75   Resp:  18   Temp:  97.3 °F (36.3 °C)   TempSrc:  Skin   SpO2:  95%   Weight: (!) 179.6 kg (396 lb)    Height: 6' 1" (1.854 m)        REVIEW OF SYSTEMS:     GENERAL: No weight loss, malaise or fevers.  HEENT:  No recent changes in vision or hearing  NECK: Negative for lumps, no difficulty with swallowing.  RESPIRATORY: Negative for cough, wheezing or shortness of breath, patient denies any recent URI.  CARDIOVASCULAR: Negative for chest pain, leg swelling or palpitations.  GI: Negative for abdominal discomfort, blood in stools or black stools or change in bowel habits.  MUSCULOSKELETAL: See HPI.  SKIN: Negative for lesions, rash, and itching.  PSYCH: No suicidal or homicidal ideations, no current mood disturbances.  HEMATOLOGY/LYMPHOLOGY: Negative for prolonged bleeding, bruising easily or swollen nodes. Patient is not currently taking any anti-coagulants  ENDO: No history of diabetes or thyroid dysfunction  NEURO: No history of syncope, paralysis, seizures or tremors.All other reviewed and negative other than HPI.    Physical exam:  Gen: A and O x3, pleasant, well-groomed  Skin: No rashes or obvious lesions  HEENT: PERRLA, no obvious deformities on ears or in canals. No thyroid masses, trachea midline, no palpable lymph nodes in neck, axilla.  CVS: Regular rate and rhythm, normal S1 and S2, no murmurs.  Resp: Clear to auscultation " bilaterally.  Abdomen: Soft, NT/ND, normal bowel sounds present.  Musculoskeletal/Neuro: Moving all extremities    Assessment:  Other spondylosis, lumbar region  -     Case Request Operating Room: Block-nerve-medial branch-lumbar    Other orders  -     BUPivacaine (PF) 0.5% (5 mg/mL) injection          PLAN: MBB      This patient has been cleared for surgery in an ambulatory surgical facility    ASA 3,  Mallampatti Score 3  No history of anesthetic complications  Plan for RN IV sedation

## 2023-01-25 NOTE — H&P
"CC:  low back pain    HPI: The patient is a 67 y.o. male with a history of low back apin here for MBB. There are no major changes in history and physical from 12/19/22 by Oneyda.    Past Medical History:   Diagnosis Date    Cancer     SQUAMOUS CELL CARCINOMA R FA    Hepatitis a without hepatic coma     Hepatitis a without hepatic coma     Hep A during [place]....."Ate monkey meat."    HTN (hypertension)        Past Surgical History:   Procedure Laterality Date    COLONOSCOPY  2011    colon polyps removed    COLONOSCOPY N/A 01/09/2017    Procedure: COLONOSCOPY;  Surgeon: Alexei Claudio MD;  Location: [facility] ENDO;  Service: Endoscopy;  Laterality: N/A;    INJECTION OF ANESTHETIC AGENT AROUND MEDIAL BRANCH NERVES INNERVATING LUMBAR FACET JOINT Right 1/11/2023    Procedure: Block-nerve-medial branch-lumbar;  Surgeon: Matt Ma MD;  [facility]  Service: Pain Management;  Laterality: Right;  L3,4,5 MBB (oneyda)    TONSILLECTOMY      TRANSFORAMINAL EPIDURAL INJECTION OF STEROID Right 9/23/2022    Procedure: Injection,steroid,epidural,transforaminal approach;  Surgeon: Mara Jackson MD;  Location: [facility] OR;  Service: Pain Management;  Laterality: Right;  L3-L4, L4-L5, L5-S1    TRANSFORAMINAL EPIDURAL INJECTION OF STEROID Right 11/21/2022    Procedure: INJECTION, STEROID, EPIDURAL, TRANSFORAMINAL APPROACH  RIGHT L3-4;  Surgeon: Mara Jackson MD;  [facility];  Service: Pain Management;  Laterality: Right;       Family History   Problem Relation Age of Onset    Pancreatic cancer Mother     Colon cancer Brother         unsure of age of diagnosis    Crohn's disease Neg Hx     Ulcerative colitis Neg Hx        Social History     Socioeconomic History    Marital status:    Tobacco Use    Smoking status: Never    Smokeless tobacco: Never   Substance and Sexual Activity    Alcohol use: Yes     Comment: socially    Drug use: No    Sexual activity: Yes     Partners: Female       Current " "Facility-Administered Medications   Medication Dose Route Frequency Provider Last Rate Last Admin    BUPivacaine (PF) 0.5% (5 mg/mL) injection    PRN Matt Ma MD   5 mL at 01/25/23 1339     Facility-Administered Medications Ordered in Other Encounters   Medication Dose Route Frequency Provider Last Rate Last Admin    lactated ringers infusion   Intravenous Once PRN Mara Jackson MD           Review of patient's allergies indicates:   Allergen Reactions    Simvastatin Other (See Comments)     WEAKNESS       Vitals:    01/19/23 1506 01/25/23 1334   BP:  129/64   Pulse:  75   Resp:  18   Temp:  97.3 °F (36.3 °C)   TempSrc:  Skin   SpO2:  95%   Weight: (!) 179.6 kg (396 lb)    Height: 6' 1" (1.854 m)        REVIEW OF SYSTEMS:     GENERAL: No weight loss, malaise or fevers.  HEENT:  No recent changes in vision or hearing  NECK: Negative for lumps, no difficulty with swallowing.  RESPIRATORY: Negative for cough, wheezing or shortness of breath, patient denies any recent URI.  CARDIOVASCULAR: Negative for chest pain, leg swelling or palpitations.  GI: Negative for abdominal discomfort, blood in stools or black stools or change in bowel habits.  MUSCULOSKELETAL: See HPI.  SKIN: Negative for lesions, rash, and itching.  PSYCH: No suicidal or homicidal ideations, no current mood disturbances.  HEMATOLOGY/LYMPHOLOGY: Negative for prolonged bleeding, bruising easily or swollen nodes. Patient is not currently taking any anti-coagulants  ENDO: No history of diabetes or thyroid dysfunction  NEURO: No history of syncope, paralysis, seizures or tremors.All other reviewed and negative other than HPI.    Physical exam:  Gen: A and O x3, pleasant, well-groomed  Skin: No rashes or obvious lesions  HEENT: PERRLA, no obvious deformities on ears or in canals. No thyroid masses, trachea midline, no palpable lymph nodes in neck, axilla.  CVS: Regular rate and rhythm, normal S1 and S2, no murmurs.  Resp: Clear to auscultation " bilaterally.  Abdomen: Soft, NT/ND, normal bowel sounds present.  Musculoskeletal/Neuro: Moving all extremities    Assessment:  Other spondylosis, lumbar region  -     Case Request Operating Room: Block-nerve-medial branch-lumbar    Other orders  -     BUPivacaine (PF) 0.5% (5 mg/mL) injection          PLAN: MBB      This patient has been cleared for surgery in an ambulatory surgical facility    ASA 3,  Mallampatti Score 3  No history of anesthetic complications  Plan for RN IV sedation

## 2023-01-26 ENCOUNTER — PATIENT MESSAGE (OUTPATIENT)
Dept: PAIN MEDICINE | Facility: CLINIC | Age: 68
End: 2023-01-26
Payer: MEDICARE

## 2023-01-26 ENCOUNTER — TELEPHONE (OUTPATIENT)
Dept: PAIN MEDICINE | Facility: CLINIC | Age: 68
End: 2023-01-26
Payer: MEDICARE

## 2023-01-26 DIAGNOSIS — M47.896 OTHER SPONDYLOSIS, LUMBAR REGION: Primary | ICD-10-CM

## 2023-01-27 VITALS
OXYGEN SATURATION: 96 % | WEIGHT: 315 LBS | SYSTOLIC BLOOD PRESSURE: 133 MMHG | HEART RATE: 71 BPM | DIASTOLIC BLOOD PRESSURE: 70 MMHG | TEMPERATURE: 97 F | BODY MASS INDEX: 41.75 KG/M2 | RESPIRATION RATE: 18 BRPM | HEIGHT: 73 IN

## 2023-02-15 ENCOUNTER — HOSPITAL ENCOUNTER (OUTPATIENT)
Facility: HOSPITAL | Age: 68
Discharge: HOME | End: 2023-02-15
Attending: ANESTHESIOLOGY | Admitting: ANESTHESIOLOGY
Payer: MEDICARE

## 2023-02-15 VITALS
HEART RATE: 64 BPM | OXYGEN SATURATION: 96 % | SYSTOLIC BLOOD PRESSURE: 135 MMHG | BODY MASS INDEX: 41.75 KG/M2 | TEMPERATURE: 99 F | DIASTOLIC BLOOD PRESSURE: 87 MMHG | WEIGHT: 315 LBS | RESPIRATION RATE: 14 BRPM | HEIGHT: 73 IN

## 2023-02-15 DIAGNOSIS — M47.896 OTHER SPONDYLOSIS, LUMBAR REGION: ICD-10-CM

## 2023-02-15 PROCEDURE — 71000015 HC POSTOP RECOV 1ST HR: Performed by: ANESTHESIOLOGY

## 2023-02-15 PROCEDURE — 36000705 HC OR TIME LEV I EA ADD 15 MIN: Performed by: ANESTHESIOLOGY

## 2023-02-15 PROCEDURE — 64636 DESTROY L/S FACET JNT ADDL: CPT | Mod: RT,,, | Performed by: ANESTHESIOLOGY

## 2023-02-15 PROCEDURE — 64636 PR DESTROY L/S FACET JNT ADDL: ICD-10-PCS | Mod: RT,,, | Performed by: ANESTHESIOLOGY

## 2023-02-15 PROCEDURE — 63600175 PHARM REV CODE 636 W HCPCS: Performed by: ANESTHESIOLOGY

## 2023-02-15 PROCEDURE — 36000704 HC OR TIME LEV I 1ST 15 MIN: Performed by: ANESTHESIOLOGY

## 2023-02-15 PROCEDURE — 99900104 DSU ONLY-NO CHARGE-EA ADD'L HR (STAT): Performed by: ANESTHESIOLOGY

## 2023-02-15 PROCEDURE — 99900103 DSU ONLY-NO CHARGE-INITIAL HR (STAT): Performed by: ANESTHESIOLOGY

## 2023-02-15 PROCEDURE — 64635 PR DESTROY LUMB/SAC FACET JNT: ICD-10-PCS | Mod: RT,,, | Performed by: ANESTHESIOLOGY

## 2023-02-15 PROCEDURE — 27201423 OPTIME MED/SURG SUP & DEVICES STERILE SUPPLY: Performed by: ANESTHESIOLOGY

## 2023-02-15 PROCEDURE — 64635 DESTROY LUMB/SAC FACET JNT: CPT | Mod: RT,,, | Performed by: ANESTHESIOLOGY

## 2023-02-15 PROCEDURE — 25000003 PHARM REV CODE 250: Performed by: ANESTHESIOLOGY

## 2023-02-15 RX ORDER — ASPIRIN 325 MG
325 TABLET, DELAYED RELEASE (ENTERIC COATED) ORAL DAILY
Status: ON HOLD | COMMUNITY
End: 2023-05-03 | Stop reason: HOSPADM

## 2023-02-15 RX ORDER — LIDOCAINE HYDROCHLORIDE 10 MG/ML
INJECTION, SOLUTION EPIDURAL; INFILTRATION; INTRACAUDAL; PERINEURAL
Status: DISCONTINUED | OUTPATIENT
Start: 2023-02-15 | End: 2023-02-15 | Stop reason: HOSPADM

## 2023-02-15 RX ORDER — METHYLPREDNISOLONE ACETATE 80 MG/ML
INJECTION, SUSPENSION INTRA-ARTICULAR; INTRALESIONAL; INTRAMUSCULAR; SOFT TISSUE
Status: DISCONTINUED | OUTPATIENT
Start: 2023-02-15 | End: 2023-02-15 | Stop reason: HOSPADM

## 2023-02-15 RX ORDER — BUPIVACAINE HYDROCHLORIDE 2.5 MG/ML
INJECTION, SOLUTION EPIDURAL; INFILTRATION; INTRACAUDAL
Status: DISCONTINUED | OUTPATIENT
Start: 2023-02-15 | End: 2023-02-15 | Stop reason: HOSPADM

## 2023-02-15 RX ORDER — SODIUM CHLORIDE, SODIUM LACTATE, POTASSIUM CHLORIDE, CALCIUM CHLORIDE 600; 310; 30; 20 MG/100ML; MG/100ML; MG/100ML; MG/100ML
INJECTION, SOLUTION INTRAVENOUS ONCE AS NEEDED
Status: COMPLETED | OUTPATIENT
Start: 2023-02-15 | End: 2023-02-15

## 2023-02-15 RX ORDER — FENTANYL CITRATE 50 UG/ML
INJECTION, SOLUTION INTRAMUSCULAR; INTRAVENOUS
Status: DISCONTINUED | OUTPATIENT
Start: 2023-02-15 | End: 2023-02-15 | Stop reason: HOSPADM

## 2023-02-15 RX ORDER — MIDAZOLAM HYDROCHLORIDE 1 MG/ML
INJECTION, SOLUTION INTRAMUSCULAR; INTRAVENOUS
Status: DISCONTINUED | OUTPATIENT
Start: 2023-02-15 | End: 2023-02-15 | Stop reason: HOSPADM

## 2023-02-15 RX ADMIN — SODIUM CHLORIDE, POTASSIUM CHLORIDE, SODIUM LACTATE AND CALCIUM CHLORIDE: 600; 310; 30; 20 INJECTION, SOLUTION INTRAVENOUS at 07:02

## 2023-02-15 NOTE — DISCHARGE INSTRUCTIONS
Pain injection instructions:     This procedure may take a couple weeks to relieve pain    No driving for 24 hrs.   Activity as tolerated- gradually increase activities.  Dont lift over 10 lbs for 24 hrs   No heat at injection sites x 2 days. No heating pads, hot tubs, saunas, or swimming in any body of water or pool for 2 days.  Use ice pack for mild swelling and for comfort , apply for 20 minutes, remove for 20 minute intervals. No direct contact of ice itself  to skin.  May shower today. No tub baths for two days.      Resume Aspirin, Plavix, or Coumadin the day after the procedure unless otherwise instructed.   If diabetic,monitor your glucose carefully as steroids can increase your glucose level    Seek immediate medical help for:   Severe increase in your usual pain or appearance of new pain.  Prolonged (mor than 8 hours) or increasing weakness or numbness in the legs or arms.  .    Fever above 101 ,Drainage,redness,active bleeding, or increased swelling at the injection site.  Headache, shortness of breath, chest pain, or breathing problems.

## 2023-02-15 NOTE — DISCHARGE SUMMARY
[facility]  Discharge Note  Short Stay    Procedure(s) (LRB):  Radiofrequency Ablation, Nerve, Spinal, Lumbar, Medial Branch, L3, 4 ,5 (Right)      OUTCOME: Patient tolerated treatment/procedure well without complication and is now ready for discharge.    DISPOSITION: Home or Self Care    FINAL DIAGNOSIS:  <principal problem not specified>    FOLLOWUP: In clinic    DISCHARGE INSTRUCTIONS:    Discharge Procedure Orders   Notify your health care provider if you experience any of the following:  temperature >100.4     Notify your health care provider if you experience any of the following:  severe uncontrolled pain     Notify your health care provider if you experience any of the following:  redness, tenderness, or signs of infection (pain, swelling, redness, odor or green/yellow discharge around incision site)     Activity as tolerated        TIME SPENT ON DISCHARGE: 30 minutes

## 2023-02-15 NOTE — PLAN OF CARE
Vss, brigette po fluids, denies pain, ambulates easily. IV removed, catheter intact. Bandaids to back CDI, no weakness or pain noted. Discharge instructions provided and states understanding. States ready to go home.  Discharged from facility with family per wheelchair.

## 2023-02-15 NOTE — OR NURSING
Patient in room, placed on monitors. Positioned with Safety precautions maintained. O2 in place. VS WNL. Patient Stable. Will continue to monitor.

## 2023-02-15 NOTE — OP NOTE
PROCEDURE DATE: 2/15/2023    PROCEDURE:  Radiofrequency ablation of the L3,4,5 medial branch nerves on the right-side utilizing fluoroscopy    DIAGNOSIS:  Other lumbar spondylosis  Post op Diagnosis: Same    PHYSICIAN: Matt Ma MD    MEDICATIONS INJECTED:  From a mixture of 6ml of 0.25% bupivicaine and 80mg of methylprednisone,  1ml of this solution was injected at each level.    LOCAL ANESTHETIC USED: Lidocaine 1%, 2 ml given at each site.    SEDATION MEDICATIONS: RN IV sedation    ESTIMATED BLOOD LOSS:  NOne    COMPLICATIONS:  None    TECHNIQUE:  A time out was taken to identify patient and procedure side prior to starting the procedure. Laying in a prone position, the patient was prepped and draped in the usual sterile fashion using ChloraPrep and sterile towels.  The levels were determined under fluoroscopic guidance and then marked.  Local anesthetic was given by raising a wheal at the skin over each site and then infiltrated approximately 2cm deeper.  A 18-gauge  150 mm RF needle was introduced to the anatomic location of the right L3,4,5 medial branch nerves. . Motor stimulation up to 2 Volts at each level confirmed no motor nerve involvement.  Impedance was less than 800 ohms at each level.  1ml of 2% lidocaine was instilled prior to lesioning.  Ablation was performed per level utilizing radiofrequency generator 80°C for 60 seconds.  Needles were then rotated 90° and a second lesion was performed.  The above noted medication was then injected slowly. The patient tolerated the procedure well.     The patient was monitored after the procedure.  Patient was given post procedure and discharge instructions to follow at home.  The patient was discharged in a stable condition

## 2023-03-27 ENCOUNTER — OFFICE VISIT (OUTPATIENT)
Dept: ORTHOPEDICS | Facility: CLINIC | Age: 68
End: 2023-03-27
Payer: MEDICARE

## 2023-03-27 VITALS — WEIGHT: 315 LBS | HEIGHT: 73 IN | BODY MASS INDEX: 41.75 KG/M2

## 2023-03-27 DIAGNOSIS — M54.16 LUMBAR RADICULOPATHY: ICD-10-CM

## 2023-03-27 DIAGNOSIS — M48.061 LUMBAR FORAMINAL STENOSIS: Primary | ICD-10-CM

## 2023-03-27 DIAGNOSIS — M51.36 LUMBAR DEGENERATIVE DISC DISEASE: ICD-10-CM

## 2023-03-27 DIAGNOSIS — M47.816 FACET ARTHRITIS OF LUMBAR REGION: ICD-10-CM

## 2023-03-27 DIAGNOSIS — M51.26 PROTRUSION OF LUMBAR INTERVERTEBRAL DISC: ICD-10-CM

## 2023-03-27 PROCEDURE — 3008F PR BODY MASS INDEX (BMI) DOCUMENTED: ICD-10-PCS | Mod: CPTII,S$GLB,, | Performed by: ORTHOPAEDIC SURGERY

## 2023-03-27 PROCEDURE — 1101F PR PT FALLS ASSESS DOC 0-1 FALLS W/OUT INJ PAST YR: ICD-10-PCS | Mod: CPTII,S$GLB,, | Performed by: ORTHOPAEDIC SURGERY

## 2023-03-27 PROCEDURE — 1125F AMNT PAIN NOTED PAIN PRSNT: CPT | Mod: CPTII,S$GLB,, | Performed by: ORTHOPAEDIC SURGERY

## 2023-03-27 PROCEDURE — 3288F FALL RISK ASSESSMENT DOCD: CPT | Mod: CPTII,S$GLB,, | Performed by: ORTHOPAEDIC SURGERY

## 2023-03-27 PROCEDURE — 1125F PR PAIN SEVERITY QUANTIFIED, PAIN PRESENT: ICD-10-PCS | Mod: CPTII,S$GLB,, | Performed by: ORTHOPAEDIC SURGERY

## 2023-03-27 PROCEDURE — 1160F PR REVIEW ALL MEDS BY PRESCRIBER/CLIN PHARMACIST DOCUMENTED: ICD-10-PCS | Mod: CPTII,S$GLB,, | Performed by: ORTHOPAEDIC SURGERY

## 2023-03-27 PROCEDURE — 99213 PR OFFICE/OUTPT VISIT, EST, LEVL III, 20-29 MIN: ICD-10-PCS | Mod: S$GLB,,, | Performed by: ORTHOPAEDIC SURGERY

## 2023-03-27 PROCEDURE — 3288F PR FALLS RISK ASSESSMENT DOCUMENTED: ICD-10-PCS | Mod: CPTII,S$GLB,, | Performed by: ORTHOPAEDIC SURGERY

## 2023-03-27 PROCEDURE — 3008F BODY MASS INDEX DOCD: CPT | Mod: CPTII,S$GLB,, | Performed by: ORTHOPAEDIC SURGERY

## 2023-03-27 PROCEDURE — 1159F PR MEDICATION LIST DOCUMENTED IN MEDICAL RECORD: ICD-10-PCS | Mod: CPTII,S$GLB,, | Performed by: ORTHOPAEDIC SURGERY

## 2023-03-27 PROCEDURE — 1159F MED LIST DOCD IN RCRD: CPT | Mod: CPTII,S$GLB,, | Performed by: ORTHOPAEDIC SURGERY

## 2023-03-27 PROCEDURE — 99213 OFFICE O/P EST LOW 20 MIN: CPT | Mod: S$GLB,,, | Performed by: ORTHOPAEDIC SURGERY

## 2023-03-27 PROCEDURE — 1101F PT FALLS ASSESS-DOCD LE1/YR: CPT | Mod: CPTII,S$GLB,, | Performed by: ORTHOPAEDIC SURGERY

## 2023-03-27 PROCEDURE — 1160F RVW MEDS BY RX/DR IN RCRD: CPT | Mod: CPTII,S$GLB,, | Performed by: ORTHOPAEDIC SURGERY

## 2023-03-27 NOTE — PROGRESS NOTES
Subjective:       Patient ID: Matt Martinez is a 67 y.o. male.    Chief Complaint: Pain of the Lumbar Spine (Right L3,4,5 medial branch nerve blocks 1/25/23, RFA L3,4,5 medial branch nerves on the right-side 2/15/23, patient got some pain relief from the procedures he had w/ Dr. Ma, it only last for about three weeks then it went back hurting)      History of Present Illness    Prior to meeting with the patient I reviewed the medical chart in [facility]. This included reviewing the previous progress notes from our office, review of the patient's last appointment with their primary care provider, review of any visits to the emergency room, and review of any pain management appointments or procedures.   67-year-old male, returns to clinic today to follow-up on chronic low back pain.  We had sent him to pain management, he was seen Dr. Ma who performed a series of medial branch blocks on January 11th and then January 25th respectively, before ultimately performing a rhizotomy at the right L3, L4, L5 levels on February 15, 2023.  This was all done after the patient saw Dr. Howard and had right-sided L3-4, L4-5, L5-S1 transforaminal epidural steroid injections in an effort to be as conservative as possible.    Please see my initial consultation note or on August 15th, 2022 for much more detailed chronologic history of the patient's present illness.    Current Medications  Current Outpatient Medications   Medication Sig Dispense Refill    amlodipine (NORVASC) 10 MG tablet Take 15 mg by mouth once daily.      diphenhydrAMINE-acetaminophen (TYLENOL PM)  mg Tab Take 1 tablet by mouth nightly as needed.      hydrochlorothiazide (HYDRODIURIL) 25 MG tablet Take 25 mg by mouth once daily.      losartan (COZAAR) 50 MG tablet Take 50 mg by mouth 2 (two) times daily.      methocarbamoL (ROBAXIN) 500 MG Tab Take 1 tablet (500 mg total) by mouth 4 (four) times daily. 120 tablet 2    aspirin (ECOTRIN) 325 MG EC tablet Take 325 mg by  "mouth once daily.       No current facility-administered medications for this visit.     Facility-Administered Medications Ordered in Other Visits   Medication Dose Route Frequency Provider Last Rate Last Admin    lactated ringers infusion   Intravenous Once PRN Mara Jackson MD           Allergies  Review of patient's allergies indicates:   Allergen Reactions    Simvastatin Other (See Comments)     WEAKNESS       Past Medical History  Past Medical History:   Diagnosis Date    Cancer     SQUAMOUS CELL CARCINOMA R FA    Hepatitis a without hepatic coma     Hepatitis a without hepatic coma     Hep A during [place] War....."Ate monkey meat."    HTN (hypertension)     Sleep apnea        Surgical History  Past Surgical History:   Procedure Laterality Date    COLONOSCOPY  2011    colon polyps removed    COLONOSCOPY N/A 01/09/2017    Procedure: COLONOSCOPY;  Surgeon: Alexei Claudio MD;  Location: [facility] ENDO;  Service: Endoscopy;  Laterality: N/A;    INJECTION OF ANESTHETIC AGENT AROUND MEDIAL BRANCH NERVES INNERVATING LUMBAR FACET JOINT Right 1/11/2023    Procedure: Block-nerve-medial branch-lumbar;  Surgeon: Matt Ma MD;  Location: [facility] OR;  Service: Pain Management;  Laterality: Right;  L3,4,5 MBB (oneyda)    INJECTION OF ANESTHETIC AGENT AROUND MEDIAL BRANCH NERVES INNERVATING LUMBAR FACET JOINT Right 1/25/2023    Procedure: Block-nerve-medial branch-lumbar;  Surgeon: Matt Ma MD;  Location: [facility] OR;  Service: Pain Management;  Laterality: Right;  L3,4,5 #2   DR MORIN    RADIOFREQUENCY ABLATION OF LUMBAR MEDIAL BRANCH NERVE AT SINGLE LEVEL Right 2/15/2023    Procedure: Radiofrequency Ablation, Nerve, Spinal, Lumbar, Medial Branch, L3, 4 ,5;  Surgeon: Matt Ma MD;  Location: [facility] OR;  Service: Pain Management;  Laterality: Right;    TONSILLECTOMY      TRANSFORAMINAL EPIDURAL INJECTION OF STEROID Right 9/23/2022    Procedure: Injection,steroid,epidural,transforaminal approach;  Surgeon: Mara Jackson MD;  " Location: [facility] OR;  Service: Pain Management;  Laterality: Right;  L3-L4, L4-L5, L5-S1    TRANSFORAMINAL EPIDURAL INJECTION OF STEROID Right 11/21/2022    Procedure: INJECTION, STEROID, EPIDURAL, TRANSFORAMINAL APPROACH  RIGHT L3-4;  Surgeon: Mara Jackson MD;  Location: [facility] OR;  Service: Pain Management;  Laterality: Right;       Family History:   Family History   Problem Relation Age of Onset    Pancreatic cancer Mother     Colon cancer Brother         unsure of age of diagnosis    Crohn's disease Neg Hx     Ulcerative colitis Neg Hx        Social History:   Social History     Socioeconomic History    Marital status:    Tobacco Use    Smoking status: Never    Smokeless tobacco: Never   Substance and Sexual Activity    Alcohol use: Yes     Comment: socially    Drug use: No    Sexual activity: Yes     Partners: Female       Hospitalization/Major Diagnostic Procedure:     Review of Systems     General/Constitutional:  Chills denies. Fatigue denies. Fever denies. Weight gain denies. Weight loss denies.    Respiratory:  Shortness of breath denies.    Cardiovascular:  Chest pain denies.    Gastrointestinal:  Constipation denies. Diarrhea denies. Nausea denies. Vomiting denies.     Hematology:  Easy bruising denies. Prolonged bleeding denies.     Genitourinary:  Frequent urination denies. Pain in lower back denies. Painful urination denies.     Musculoskeletal:  See HPI for details    Skin:  Rash denies.    Neurologic:  Dizziness denies. Gait abnormalities denies. Seizures denies. Tingling/Numbess denies.    Psychiatric:  Anxiety denies. Depressed mood denies.     Objective:   Vital Signs: There were no vitals filed for this visit.     Physical Exam      General Examination:     Constitutional: The patient is alert and oriented to lace person and time. Mood is pleasant.     Head/Face: Normal facial features normal eyebrows    Eyes: Normal extraocular motion bilaterally    Lungs: Respirations are equal and  unlabored    Gait is coordinated.    Cardiovascular: There are no swelling or varicosities present.    Lymphatic: Negative for adenopathy    Skin: Normal    Neurological: Level of consciousness normal. Oriented to place person and time and situation    Psychiatric: Oriented to time place person and situation    Examination of the lumbar spine, the patient demonstrates normal range of motion with both flexion and extension of the lumbar spine.  Extension and rotation or lateral bending to the right will produce the right calf pain but again not radicular in nature of.  Isolated sharp stabbing pain to the posterior lateral aspect of the right calf.     Examination of the right lower extremity, the patient does have some thinning of the skin, it is absent of hair, he has pitting edema 2+.  No effusion noted in the knee, normal range of motion of the knee, no crepitus of the patellofemoral joint.  Range of motion 0-110 degrees.  Straight leg raise is negative.  Knee is stable anterior-posterior varus and valgus stress.  Of note patient had no significant pain or discomfort with abduction or adduction of the hip, no significant pain or limitations with flexion extension or internal external rotation of the right hip.     XRAY Report/ Interpretation:      Assessment:       1. Lumbar foraminal stenosis    2. Lumbar degenerative disc disease    3. Facet arthritis of lumbar region    4. Protrusion of lumbar intervertebral disc    5. Lumbar radiculopathy          Plan:       Matt was seen today for pain.    Diagnoses and all orders for this visit:    Lumbar foraminal stenosis  -     X-Ray Lumbar Spine Ap And Lateral    Lumbar degenerative disc disease  -     X-Ray Lumbar Spine Ap And Lateral    Facet arthritis of lumbar region  -     X-Ray Lumbar Spine Ap And Lateral    Protrusion of lumbar intervertebral disc    Lumbar radiculopathy  -     EMG W/ ULTRASOUND AND NERVE CONDUCTION TEST 2 Extremities; Future         Follow up in  "about 3 weeks (around 4/17/2023) for EMG/NCS Results.  This is to attest that the physician's assistant Miguel A Myers served in the capacity as a scribe" for this patient encounter.  This is also to verify that I have reviewed the patient's history and helped formulate the treatment plan and discussed it with the physician's assistant.  I have actively participated in the evaluation and treatment plan for this patient is visit.  The treatment plan and medical decision-making is outlined below:  67-year-old male with persistent right lower extremity pain.  Localized to the lateral aspect of the right calf.  Exacerbated by movement not radicular but reproducible in nature with rotation lateral bending.  Straight leg raise was negative.  He has been through the gamut of pain management procedures with no significant change or relief for improvement in his symptoms.  Plan will be to do EMG of the bilateral lower extremities to evaluate for radiculopathy versus neuropathy.    Treatment options were discussed with regards to the nature of the medical condition. Conservative pain intervention and surgical options were discussed in detail. The probability of success of each separate treatment option was discussed. The patient expressed a clear understanding of the treatment options. With regards to surgery, the procedure risk, benefits, complications, and outcomes were discussed. No guarantees were given with regards to surgical outcome.   The risk of complications, morbidity, and mortality of patient management decisions have been made at the time of this visit. These are associated with the patient's problems, diagnostic procedures and treatment options. This includes the possible management options selected and those considered but not selected by the patient after shared medical decision making we discussed with the patient.     This note was created using Dragon voice recognition software that occasionally " misinterpreted phrases or words.

## 2023-04-19 ENCOUNTER — OFFICE VISIT (OUTPATIENT)
Dept: ORTHOPEDICS | Facility: CLINIC | Age: 68
End: 2023-04-19
Payer: MEDICARE

## 2023-04-19 ENCOUNTER — TELEPHONE (OUTPATIENT)
Dept: PAIN MEDICINE | Facility: CLINIC | Age: 68
End: 2023-04-19
Payer: MEDICARE

## 2023-04-19 VITALS — BODY MASS INDEX: 41.75 KG/M2 | HEIGHT: 73 IN | WEIGHT: 315 LBS

## 2023-04-19 DIAGNOSIS — M54.16 CHRONIC LUMBAR RADICULOPATHY: Primary | ICD-10-CM

## 2023-04-19 DIAGNOSIS — M54.16 LUMBAR RADICULOPATHY: ICD-10-CM

## 2023-04-19 DIAGNOSIS — M48.061 LUMBAR FORAMINAL STENOSIS: Primary | ICD-10-CM

## 2023-04-19 DIAGNOSIS — M47.816 FACET ARTHRITIS OF LUMBAR REGION: ICD-10-CM

## 2023-04-19 DIAGNOSIS — M51.36 LUMBAR DEGENERATIVE DISC DISEASE: ICD-10-CM

## 2023-04-19 PROCEDURE — 1101F PR PT FALLS ASSESS DOC 0-1 FALLS W/OUT INJ PAST YR: ICD-10-PCS | Mod: CPTII,S$GLB,, | Performed by: ORTHOPAEDIC SURGERY

## 2023-04-19 PROCEDURE — 3288F FALL RISK ASSESSMENT DOCD: CPT | Mod: CPTII,S$GLB,, | Performed by: ORTHOPAEDIC SURGERY

## 2023-04-19 PROCEDURE — 3288F PR FALLS RISK ASSESSMENT DOCUMENTED: ICD-10-PCS | Mod: CPTII,S$GLB,, | Performed by: ORTHOPAEDIC SURGERY

## 2023-04-19 PROCEDURE — 99213 PR OFFICE/OUTPT VISIT, EST, LEVL III, 20-29 MIN: ICD-10-PCS | Mod: S$GLB,,, | Performed by: ORTHOPAEDIC SURGERY

## 2023-04-19 PROCEDURE — 1160F PR REVIEW ALL MEDS BY PRESCRIBER/CLIN PHARMACIST DOCUMENTED: ICD-10-PCS | Mod: CPTII,S$GLB,, | Performed by: ORTHOPAEDIC SURGERY

## 2023-04-19 PROCEDURE — 1159F PR MEDICATION LIST DOCUMENTED IN MEDICAL RECORD: ICD-10-PCS | Mod: CPTII,S$GLB,, | Performed by: ORTHOPAEDIC SURGERY

## 2023-04-19 PROCEDURE — 3008F PR BODY MASS INDEX (BMI) DOCUMENTED: ICD-10-PCS | Mod: CPTII,S$GLB,, | Performed by: ORTHOPAEDIC SURGERY

## 2023-04-19 PROCEDURE — 99213 OFFICE O/P EST LOW 20 MIN: CPT | Mod: S$GLB,,, | Performed by: ORTHOPAEDIC SURGERY

## 2023-04-19 PROCEDURE — 1125F AMNT PAIN NOTED PAIN PRSNT: CPT | Mod: CPTII,S$GLB,, | Performed by: ORTHOPAEDIC SURGERY

## 2023-04-19 PROCEDURE — 3008F BODY MASS INDEX DOCD: CPT | Mod: CPTII,S$GLB,, | Performed by: ORTHOPAEDIC SURGERY

## 2023-04-19 PROCEDURE — 1160F RVW MEDS BY RX/DR IN RCRD: CPT | Mod: CPTII,S$GLB,, | Performed by: ORTHOPAEDIC SURGERY

## 2023-04-19 PROCEDURE — 1125F PR PAIN SEVERITY QUANTIFIED, PAIN PRESENT: ICD-10-PCS | Mod: CPTII,S$GLB,, | Performed by: ORTHOPAEDIC SURGERY

## 2023-04-19 PROCEDURE — 1101F PT FALLS ASSESS-DOCD LE1/YR: CPT | Mod: CPTII,S$GLB,, | Performed by: ORTHOPAEDIC SURGERY

## 2023-04-19 PROCEDURE — 1159F MED LIST DOCD IN RCRD: CPT | Mod: CPTII,S$GLB,, | Performed by: ORTHOPAEDIC SURGERY

## 2023-04-19 RX ORDER — HYDROCODONE BITARTRATE AND ACETAMINOPHEN 7.5; 325 MG/1; MG/1
1 TABLET ORAL EVERY 6 HOURS PRN
Qty: 28 TABLET | Refills: 0 | Status: SHIPPED | OUTPATIENT
Start: 2023-04-19 | End: 2023-07-07 | Stop reason: SDUPTHER

## 2023-04-19 RX ORDER — HYDROCODONE BITARTRATE AND ACETAMINOPHEN 7.5; 325 MG/1; MG/1
1 TABLET ORAL EVERY 6 HOURS PRN
Qty: 30 TABLET | Refills: 0 | Status: SHIPPED | OUTPATIENT
Start: 2023-04-19 | End: 2023-04-19

## 2023-04-19 NOTE — TELEPHONE ENCOUNTER
Spoke with pt scheduled for 05/03 OR ( BMI) went over all instructions including holding ASA and Meloxicam

## 2023-04-19 NOTE — H&P (VIEW-ONLY)
Subjective:       Patient ID: Matt Martinez is a 67 y.o. male.    Chief Complaint: Pain of the Lumbar Spine (B/l Lower Extremity EMG/NCS 04/03/23, patient is here to go over results.)      History of Present Illness    Prior to meeting with the patient I reviewed the medical chart in [facility]. This included reviewing the previous progress notes from our office, review of the patient's last appointment with their primary care provider, review of any visits to the emergency room, and review of any pain management appointments or procedures.   Patient returns for reassessment of his discuss the EMG results has had back pain radiating down the right leg we ordered an EMG to discern whether he has a neuropathy versus radiculopathy in the right leg and right leg symptoms have improved he has already had a radiofrequency ablation.  He has had 2 prior multilevel transforaminal epidural steroid injections in the past.    Current Medications  Current Outpatient Medications   Medication Sig Dispense Refill    amlodipine (NORVASC) 10 MG tablet Take 15 mg by mouth once daily.      diphenhydrAMINE-acetaminophen (TYLENOL PM)  mg Tab Take 1 tablet by mouth nightly as needed.      hydrochlorothiazide (HYDRODIURIL) 25 MG tablet Take 25 mg by mouth once daily.      losartan (COZAAR) 50 MG tablet Take 50 mg by mouth 2 (two) times daily.      methocarbamoL (ROBAXIN) 500 MG Tab TAKE ONE TABLET (500 MG) BY MOUTH FOUR TIMES DAILY 120 tablet 2    aspirin (ECOTRIN) 325 MG EC tablet Take 325 mg by mouth once daily.      HYDROcodone-acetaminophen (NORCO) 7.5-325 mg per tablet Take 1 tablet by mouth every 6 (six) hours as needed for Pain. 28 tablet 0     No current facility-administered medications for this visit.     Facility-Administered Medications Ordered in Other Visits   Medication Dose Route Frequency Provider Last Rate Last Admin    lactated ringers infusion   Intravenous Once PRN Maar Jackson MD           Allergies  Review of  "patient's allergies indicates:   Allergen Reactions    Simvastatin Other (See Comments)     WEAKNESS       Past Medical History  Past Medical History:   Diagnosis Date    Cancer     SQUAMOUS CELL CARCINOMA R FA    Hepatitis a without hepatic coma     Hepatitis a without hepatic coma     Hep A during [place]....."Ate monkey meat."    HTN (hypertension)     Sleep apnea        Surgical History  Past Surgical History:   Procedure Laterality Date    COLONOSCOPY  2011    colon polyps removed    COLONOSCOPY N/A 01/09/2017    Procedure: COLONOSCOPY;  Surgeon: Alexei Claudio MD;  Location: [facility] ENDO;  Service: Endoscopy;  Laterality: N/A;    INJECTION OF ANESTHETIC AGENT AROUND MEDIAL BRANCH NERVES INNERVATING LUMBAR FACET JOINT Right 1/11/2023    Procedure: Block-nerve-medial branch-lumbar;  Surgeon: Matt Ma MD;  Location: [facility] OR;  Service: Pain Management;  Laterality: Right;  L3,4,5 MBB (morin)    INJECTION OF ANESTHETIC AGENT AROUND MEDIAL BRANCH NERVES INNERVATING LUMBAR FACET JOINT Right 1/25/2023    Procedure: Block-nerve-medial branch-lumbar;  Surgeon: Matt Ma MD;  Location: [facility] OR;  Service: Pain Management;  Laterality: Right;  L3,4,5 #2   DR MORIN    RADIOFREQUENCY ABLATION OF LUMBAR MEDIAL BRANCH NERVE AT SINGLE LEVEL Right 2/15/2023    Procedure: Radiofrequency Ablation, Nerve, Spinal, Lumbar, Medial Branch, L3, 4 ,5;  Surgeon: Matt Ma MD;  Location: [facility] OR;  Service: Pain Management;  Laterality: Right;    TONSILLECTOMY      TRANSFORAMINAL EPIDURAL INJECTION OF STEROID Right 9/23/2022    Procedure: Injection,steroid,epidural,transforaminal approach;  Surgeon: Mara Jackson MD;  Location: [facility] OR;  Service: Pain Management;  Laterality: Right;  L3-L4, L4-L5, L5-S1    TRANSFORAMINAL EPIDURAL INJECTION OF STEROID Right 11/21/2022    Procedure: INJECTION, STEROID, EPIDURAL, TRANSFORAMINAL APPROACH  RIGHT L3-4;  Surgeon: Mara Jackson MD;  Location: [facility] OR;  Service: Pain Management;  " Laterality: Right;       Family History:   Family History   Problem Relation Age of Onset    Pancreatic cancer Mother     Colon cancer Brother         unsure of age of diagnosis    Crohn's disease Neg Hx     Ulcerative colitis Neg Hx        Social History:   Social History     Socioeconomic History    Marital status:    Tobacco Use    Smoking status: Never    Smokeless tobacco: Never   Substance and Sexual Activity    Alcohol use: Yes     Comment: socially    Drug use: No    Sexual activity: Yes     Partners: Female       Hospitalization/Major Diagnostic Procedure:     Review of Systems     General/Constitutional:  Chills denies. Fatigue denies. Fever denies. Weight gain denies. Weight loss denies.    Respiratory:  Shortness of breath denies.    Cardiovascular:  Chest pain denies.    Gastrointestinal:  Constipation denies. Diarrhea denies. Nausea denies. Vomiting denies.     Hematology:  Easy bruising denies. Prolonged bleeding denies.     Genitourinary:  Frequent urination denies. Pain in lower back denies. Painful urination denies.     Musculoskeletal:  See HPI for details    Skin:  Rash denies.    Neurologic:  Dizziness denies. Gait abnormalities denies. Seizures denies. Tingling/Numbess denies.    Psychiatric:  Anxiety denies. Depressed mood denies.     Objective:   Vital Signs: There were no vitals filed for this visit.     Physical Exam      General Examination:     Constitutional: The patient is alert and oriented to lace person and time. Mood is pleasant.     Head/Face: Normal facial features normal eyebrows    Eyes: Normal extraocular motion bilaterally    Lungs: Respirations are equal and unlabored    Gait is coordinated.    Cardiovascular: There are no swelling or varicosities present.    Lymphatic: Negative for adenopathy    Skin: Normal    Neurological: Level of consciousness normal. 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      Assessment:       1. Lumbar foraminal stenosis    2. Facet arthritis of lumbar region    3. Lumbar degenerative disc disease    4. Lumbar radiculopathy        Plan:       Matt was seen today for pain.    Diagnoses and all orders for this visit:    Lumbar foraminal stenosis  -     Ambulatory referral/consult to Pain Clinic; Future    Facet arthritis of lumbar region    Lumbar degenerative disc disease    Lumbar radiculopathy  -     Ambulatory referral/consult to Pain Clinic; Future    Other orders  -     Discontinue: HYDROcodone-acetaminophen (NORCO) 7.5-325 mg per tablet; Take 1 tablet by mouth every 6 (six) hours as needed for Pain.  -     HYDROcodone-acetaminophen (NORCO) 7.5-325 mg per tablet; Take 1 tablet by mouth every 6 (six) hours as needed for Pain.         Follow up for Lumbar f/u, ref to pain mgt.    Patient has no evidence of symptoms in left side therefore I think the diagnosis of neuropathy was over stated clearly I do think his symptoms correlate with MRI and EMG findings of a right L4 and right L5 nerve root radiculopathies therefore we will order a right L4 nerve root and right L5 nerve root transforaminal epidural steroid injection a mini at the right L4 and L5 nerve roots he has rather have this done surgery but we did discuss possibility of decompression of both nerves on the right side prescription for hydrocodone given no refills  Treatment options were discussed with regards to the nature of the medical condition. Conservative pain intervention and surgical options were discussed in detail. The probability of success of each separate treatment option was discussed. The patient  expressed a clear understanding of the treatment options. With regards to surgery, the procedure risk, benefits, complications, and outcomes were discussed. No guarantees were given with regards to surgical outcome.   The risk of complications, morbidity, and mortality of patient management decisions have been made at the time of this visit. These are associated with the patient's problems, diagnostic procedures and treatment options. This includes the possible management options selected and those considered but not selected by the patient after shared medical decision making we discussed with the patient.     This note was created using Dragon voice recognition software that occasionally misinterpreted phrases or words.

## 2023-04-19 NOTE — PROGRESS NOTES
Subjective:       Patient ID: Matt Martinez is a 67 y.o. male.    Chief Complaint: Pain of the Lumbar Spine (B/l Lower Extremity EMG/NCS 04/03/23, patient is here to go over results.)      History of Present Illness    Prior to meeting with the patient I reviewed the medical chart in [facility]. This included reviewing the previous progress notes from our office, review of the patient's last appointment with their primary care provider, review of any visits to the emergency room, and review of any pain management appointments or procedures.   Patient returns for reassessment of his discuss the EMG results has had back pain radiating down the right leg we ordered an EMG to discern whether he has a neuropathy versus radiculopathy in the right leg and right leg symptoms have improved he has already had a radiofrequency ablation.  He has had 2 prior multilevel transforaminal epidural steroid injections in the past.    Current Medications  Current Outpatient Medications   Medication Sig Dispense Refill    amlodipine (NORVASC) 10 MG tablet Take 15 mg by mouth once daily.      diphenhydrAMINE-acetaminophen (TYLENOL PM)  mg Tab Take 1 tablet by mouth nightly as needed.      hydrochlorothiazide (HYDRODIURIL) 25 MG tablet Take 25 mg by mouth once daily.      losartan (COZAAR) 50 MG tablet Take 50 mg by mouth 2 (two) times daily.      methocarbamoL (ROBAXIN) 500 MG Tab TAKE ONE TABLET (500 MG) BY MOUTH FOUR TIMES DAILY 120 tablet 2    aspirin (ECOTRIN) 325 MG EC tablet Take 325 mg by mouth once daily.      HYDROcodone-acetaminophen (NORCO) 7.5-325 mg per tablet Take 1 tablet by mouth every 6 (six) hours as needed for Pain. 28 tablet 0     No current facility-administered medications for this visit.     Facility-Administered Medications Ordered in Other Visits   Medication Dose Route Frequency Provider Last Rate Last Admin    lactated ringers infusion   Intravenous Once PRN Mara Jackson MD           Allergies  Review of  "patient's allergies indicates:   Allergen Reactions    Simvastatin Other (See Comments)     WEAKNESS       Past Medical History  Past Medical History:   Diagnosis Date    Cancer     SQUAMOUS CELL CARCINOMA R FA    Hepatitis a without hepatic coma     Hepatitis a without hepatic coma     Hep A during [place] War....."Ate monkey meat."    HTN (hypertension)     Sleep apnea        Surgical History  Past Surgical History:   Procedure Laterality Date    COLONOSCOPY  2011    colon polyps removed    COLONOSCOPY N/A 01/09/2017    Procedure: COLONOSCOPY;  Surgeon: Alexei Claudio MD;  Location: [facility] ENDO;  Service: Endoscopy;  Laterality: N/A;    INJECTION OF ANESTHETIC AGENT AROUND MEDIAL BRANCH NERVES INNERVATING LUMBAR FACET JOINT Right 1/11/2023    Procedure: Block-nerve-medial branch-lumbar;  Surgeon: Matt Ma MD;  Location: [facility] OR;  Service: Pain Management;  Laterality: Right;  L3,4,5 MBB (morin)    INJECTION OF ANESTHETIC AGENT AROUND MEDIAL BRANCH NERVES INNERVATING LUMBAR FACET JOINT Right 1/25/2023    Procedure: Block-nerve-medial branch-lumbar;  Surgeon: Matt Ma MD;  Location: [facility] OR;  Service: Pain Management;  Laterality: Right;  L3,4,5 #2   DR MORIN    RADIOFREQUENCY ABLATION OF LUMBAR MEDIAL BRANCH NERVE AT SINGLE LEVEL Right 2/15/2023    Procedure: Radiofrequency Ablation, Nerve, Spinal, Lumbar, Medial Branch, L3, 4 ,5;  Surgeon: Matt Ma MD;  Location: [facility] OR;  Service: Pain Management;  Laterality: Right;    TONSILLECTOMY      TRANSFORAMINAL EPIDURAL INJECTION OF STEROID Right 9/23/2022    Procedure: Injection,steroid,epidural,transforaminal approach;  Surgeon: Mara Jackson MD;  Location: [facility] OR;  Service: Pain Management;  Laterality: Right;  L3-L4, L4-L5, L5-S1    TRANSFORAMINAL EPIDURAL INJECTION OF STEROID Right 11/21/2022    Procedure: INJECTION, STEROID, EPIDURAL, TRANSFORAMINAL APPROACH  RIGHT L3-4;  Surgeon: Mara Jackson MD;  Location: [facility] OR;  Service: Pain Management;  " Laterality: Right;       Family History:   Family History   Problem Relation Age of Onset    Pancreatic cancer Mother     Colon cancer Brother         unsure of age of diagnosis    Crohn's disease Neg Hx     Ulcerative colitis Neg Hx        Social History:   Social History     Socioeconomic History    Marital status:    Tobacco Use    Smoking status: Never    Smokeless tobacco: Never   Substance and Sexual Activity    Alcohol use: Yes     Comment: socially    Drug use: No    Sexual activity: Yes     Partners: Female       Hospitalization/Major Diagnostic Procedure:     Review of Systems     General/Constitutional:  Chills denies. Fatigue denies. Fever denies. Weight gain denies. Weight loss denies.    Respiratory:  Shortness of breath denies.    Cardiovascular:  Chest pain denies.    Gastrointestinal:  Constipation denies. Diarrhea denies. Nausea denies. Vomiting denies.     Hematology:  Easy bruising denies. Prolonged bleeding denies.     Genitourinary:  Frequent urination denies. Pain in lower back denies. Painful urination denies.     Musculoskeletal:  See HPI for details    Skin:  Rash denies.    Neurologic:  Dizziness denies. Gait abnormalities denies. Seizures denies. Tingling/Numbess denies.    Psychiatric:  Anxiety denies. Depressed mood denies.     Objective:   Vital Signs: There were no vitals filed for this visit.     Physical Exam      General Examination:     Constitutional: The patient is alert and oriented to lace person and time. Mood is pleasant.     Head/Face: Normal facial features normal eyebrows    Eyes: Normal extraocular motion bilaterally    Lungs: Respirations are equal and unlabored    Gait is coordinated.    Cardiovascular: There are no swelling or varicosities present.    Lymphatic: Negative for adenopathy    Skin: Normal    Neurological: Level of consciousness normal. 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      Assessment:       1. Lumbar foraminal stenosis    2. Facet arthritis of lumbar region    3. Lumbar degenerative disc disease    4. Lumbar radiculopathy        Plan:       Matt was seen today for pain.    Diagnoses and all orders for this visit:    Lumbar foraminal stenosis  -     Ambulatory referral/consult to Pain Clinic; Future    Facet arthritis of lumbar region    Lumbar degenerative disc disease    Lumbar radiculopathy  -     Ambulatory referral/consult to Pain Clinic; Future    Other orders  -     Discontinue: HYDROcodone-acetaminophen (NORCO) 7.5-325 mg per tablet; Take 1 tablet by mouth every 6 (six) hours as needed for Pain.  -     HYDROcodone-acetaminophen (NORCO) 7.5-325 mg per tablet; Take 1 tablet by mouth every 6 (six) hours as needed for Pain.         Follow up for Lumbar f/u, ref to pain mgt.    Patient has no evidence of symptoms in left side therefore I think the diagnosis of neuropathy was over stated clearly I do think his symptoms correlate with MRI and EMG findings of a right L4 and right L5 nerve root radiculopathies therefore we will order a right L4 nerve root and right L5 nerve root transforaminal epidural steroid injection a mini at the right L4 and L5 nerve roots he has rather have this done surgery but we did discuss possibility of decompression of both nerves on the right side prescription for hydrocodone given no refills  Treatment options were discussed with regards to the nature of the medical condition. Conservative pain intervention and surgical options were discussed in detail. The probability of success of each separate treatment option was discussed. The patient  expressed a clear understanding of the treatment options. With regards to surgery, the procedure risk, benefits, complications, and outcomes were discussed. No guarantees were given with regards to surgical outcome.   The risk of complications, morbidity, and mortality of patient management decisions have been made at the time of this visit. These are associated with the patient's problems, diagnostic procedures and treatment options. This includes the possible management options selected and those considered but not selected by the patient after shared medical decision making we discussed with the patient.     This note was created using Dragon voice recognition software that occasionally misinterpreted phrases or words.

## 2023-04-19 NOTE — TELEPHONE ENCOUNTER
Provider Comments 04/19/2023  2:27 PM Brock iVctor MD Provider Comments -   Note:    Right L4 nerve root (L5 level) TFESI  Right L5 nerve root (L5-S1 level) TFESI     Ok to schedule?

## 2023-05-03 ENCOUNTER — HOSPITAL ENCOUNTER (OUTPATIENT)
Facility: HOSPITAL | Age: 68
Discharge: HOME | End: 2023-05-03
Attending: ANESTHESIOLOGY | Admitting: ANESTHESIOLOGY
Payer: MEDICARE

## 2023-05-03 VITALS
WEIGHT: 315 LBS | BODY MASS INDEX: 41.75 KG/M2 | HEIGHT: 73 IN | RESPIRATION RATE: 18 BRPM | OXYGEN SATURATION: 93 % | HEART RATE: 65 BPM | SYSTOLIC BLOOD PRESSURE: 124 MMHG | TEMPERATURE: 98 F | DIASTOLIC BLOOD PRESSURE: 63 MMHG

## 2023-05-03 DIAGNOSIS — M54.16 LUMBAR RADICULITIS: ICD-10-CM

## 2023-05-03 PROCEDURE — 99900103 DSU ONLY-NO CHARGE-INITIAL HR (STAT): Performed by: ANESTHESIOLOGY

## 2023-05-03 PROCEDURE — 99900104 DSU ONLY-NO CHARGE-EA ADD'L HR (STAT): Performed by: ANESTHESIOLOGY

## 2023-05-03 PROCEDURE — 25500020 PHARM REV CODE 255: Performed by: ANESTHESIOLOGY

## 2023-05-03 PROCEDURE — 63600175 PHARM REV CODE 636 W HCPCS: Performed by: ANESTHESIOLOGY

## 2023-05-03 PROCEDURE — 36000704 HC OR TIME LEV I 1ST 15 MIN: Performed by: ANESTHESIOLOGY

## 2023-05-03 PROCEDURE — 64484 NJX AA&/STRD TFRM EPI L/S EA: CPT | Mod: RT,,, | Performed by: ANESTHESIOLOGY

## 2023-05-03 PROCEDURE — 64483 PR EPIDURAL INJ, ANES/STEROID, TRANSFORAMINAL, LUMB/SACR, SNGL LEVL: ICD-10-PCS | Mod: RT,,, | Performed by: ANESTHESIOLOGY

## 2023-05-03 PROCEDURE — 25000003 PHARM REV CODE 250: Performed by: ANESTHESIOLOGY

## 2023-05-03 PROCEDURE — 64483 NJX AA&/STRD TFRM EPI L/S 1: CPT | Mod: RT,,, | Performed by: ANESTHESIOLOGY

## 2023-05-03 PROCEDURE — 64484 PRA INJECT ANES/STEROID FORAMEN LUMBAR/SACRAL W IMG GUIDE ,EA ADD LEVEL: ICD-10-PCS | Mod: RT,,, | Performed by: ANESTHESIOLOGY

## 2023-05-03 PROCEDURE — 71000015 HC POSTOP RECOV 1ST HR: Performed by: ANESTHESIOLOGY

## 2023-05-03 RX ORDER — SODIUM CHLORIDE 9 MG/ML
500 INJECTION, SOLUTION INTRAVENOUS CONTINUOUS
Status: ACTIVE | OUTPATIENT
Start: 2023-05-03

## 2023-05-03 RX ORDER — DEXAMETHASONE SODIUM PHOSPHATE 10 MG/ML
INJECTION INTRAMUSCULAR; INTRAVENOUS
Status: DISCONTINUED | OUTPATIENT
Start: 2023-05-03 | End: 2023-05-03 | Stop reason: HOSPADM

## 2023-05-03 RX ORDER — MIDAZOLAM HYDROCHLORIDE 1 MG/ML
INJECTION, SOLUTION INTRAMUSCULAR; INTRAVENOUS CODE/TRAUMA/SEDATION MEDICATION
Status: DISCONTINUED | OUTPATIENT
Start: 2023-05-03 | End: 2023-05-03 | Stop reason: HOSPADM

## 2023-05-03 RX ORDER — LIDOCAINE HYDROCHLORIDE 10 MG/ML
INJECTION, SOLUTION EPIDURAL; INFILTRATION; INTRACAUDAL; PERINEURAL
Status: DISCONTINUED | OUTPATIENT
Start: 2023-05-03 | End: 2023-05-03 | Stop reason: HOSPADM

## 2023-05-03 RX ORDER — BUPIVACAINE HYDROCHLORIDE 2.5 MG/ML
INJECTION, SOLUTION EPIDURAL; INFILTRATION; INTRACAUDAL
Status: DISCONTINUED | OUTPATIENT
Start: 2023-05-03 | End: 2023-05-03 | Stop reason: HOSPADM

## 2023-05-03 RX ORDER — FENTANYL CITRATE 50 UG/ML
INJECTION, SOLUTION INTRAMUSCULAR; INTRAVENOUS CODE/TRAUMA/SEDATION MEDICATION
Status: DISCONTINUED | OUTPATIENT
Start: 2023-05-03 | End: 2023-05-03 | Stop reason: HOSPADM

## 2023-05-03 NOTE — PLAN OF CARE
Discharged home with spouse post dressing and handouts. Denies any pain, all valuables were returned, and IV removed. Both stated understanding of instructions provided. Criteria met

## 2023-05-03 NOTE — DISCHARGE SUMMARY
[facility]  Discharge Note  Short Stay    Procedure(s) (LRB):  Injection,steroid,epidural,transforaminal approach (Right)      OUTCOME: Patient tolerated treatment/procedure well without complication and is now ready for discharge.    DISPOSITION: Home or Self Care    FINAL DIAGNOSIS:  <principal problem not specified>    FOLLOWUP: In clinic    DISCHARGE INSTRUCTIONS:    Discharge Procedure Orders   Notify your health care provider if you experience any of the following:  temperature >100.4     Notify your health care provider if you experience any of the following:  severe uncontrolled pain     Notify your health care provider if you experience any of the following:  redness, tenderness, or signs of infection (pain, swelling, redness, odor or green/yellow discharge around incision site)     Activity as tolerated        TIME SPENT ON DISCHARGE: 30 minutes

## 2023-05-03 NOTE — OP NOTE
PROCEDURE DATE: 5/3/2023    PROCEDURE: Right L4-5 and L5-S1 transforaminal epidural steroid injection under fluoroscopy    DIAGNOSIS: Lumbar radiculitis    Post op diagnosis: Same    PHYSICIAN: Matt Ma MD    MEDICATIONS INJECTED:  Dexamethasone 5mg (0.5ml) and 1.5ml 0.25% bupivicaine at each nerve root.     LOCAL ANESTHETIC INJECTED:  Lidocaine 1%. 2 ml per site.    SEDATION MEDICATIONS: RN IV sedation    ESTIMATED BLOOD LOSS:  none    COMPLICATIONS:  none    TECHNIQUE:   A time-out was taken to identify patient and procedure side prior to starting the procedure. The patient was placed in a prone position, prepped and draped in the usual sterile fashion using ChloraPrep and sterile towels.  The area to be injected was determined under fluoroscopic guidance in AP and oblique view.  Local anesthetic was given by raising a wheal and going down to the hub of a 25-gauge 1.5 inch needle.  In oblique view, a 5 inch 22-gauge bent-tip spinal needle was introduced towards 6 oclock position of the pedicle of each above named nerve root level.  The needle was walked medially then hinged into the neural foramen and position was confirmed in AP and lateral views.  1ml contrast dye was injected to confirm appropriate placement and that there was no vascular uptake.  After negative aspiration for blood or CSF, the medication was then injected. This was performed at the right L4-5 and L5-S1 level(s). The patient tolerated the procedure well.    The patient was monitored after the procedure.  Patient was given post procedure and discharge instructions to follow at home. The patient was discharged in a stable condition.

## 2023-05-31 ENCOUNTER — OFFICE VISIT (OUTPATIENT)
Dept: ORTHOPEDICS | Facility: CLINIC | Age: 68
End: 2023-05-31
Payer: MEDICARE

## 2023-05-31 VITALS — HEIGHT: 73 IN | WEIGHT: 315 LBS | BODY MASS INDEX: 41.75 KG/M2

## 2023-05-31 DIAGNOSIS — M54.16 LUMBAR RADICULOPATHY: ICD-10-CM

## 2023-05-31 DIAGNOSIS — M51.36 LUMBAR DEGENERATIVE DISC DISEASE: ICD-10-CM

## 2023-05-31 DIAGNOSIS — M51.26 PROTRUSION OF LUMBAR INTERVERTEBRAL DISC: ICD-10-CM

## 2023-05-31 DIAGNOSIS — M47.816 FACET ARTHRITIS OF LUMBAR REGION: Primary | ICD-10-CM

## 2023-05-31 DIAGNOSIS — M48.061 LUMBAR FORAMINAL STENOSIS: ICD-10-CM

## 2023-05-31 PROCEDURE — 1159F PR MEDICATION LIST DOCUMENTED IN MEDICAL RECORD: ICD-10-PCS | Mod: CPTII,S$GLB,, | Performed by: ORTHOPAEDIC SURGERY

## 2023-05-31 PROCEDURE — 3008F BODY MASS INDEX DOCD: CPT | Mod: CPTII,S$GLB,, | Performed by: ORTHOPAEDIC SURGERY

## 2023-05-31 PROCEDURE — 3008F PR BODY MASS INDEX (BMI) DOCUMENTED: ICD-10-PCS | Mod: CPTII,S$GLB,, | Performed by: ORTHOPAEDIC SURGERY

## 2023-05-31 PROCEDURE — 3288F PR FALLS RISK ASSESSMENT DOCUMENTED: ICD-10-PCS | Mod: CPTII,S$GLB,, | Performed by: ORTHOPAEDIC SURGERY

## 2023-05-31 PROCEDURE — 99213 OFFICE O/P EST LOW 20 MIN: CPT | Mod: S$GLB,,, | Performed by: ORTHOPAEDIC SURGERY

## 2023-05-31 PROCEDURE — 1160F RVW MEDS BY RX/DR IN RCRD: CPT | Mod: CPTII,S$GLB,, | Performed by: ORTHOPAEDIC SURGERY

## 2023-05-31 PROCEDURE — 1160F PR REVIEW ALL MEDS BY PRESCRIBER/CLIN PHARMACIST DOCUMENTED: ICD-10-PCS | Mod: CPTII,S$GLB,, | Performed by: ORTHOPAEDIC SURGERY

## 2023-05-31 PROCEDURE — 1125F PR PAIN SEVERITY QUANTIFIED, PAIN PRESENT: ICD-10-PCS | Mod: CPTII,S$GLB,, | Performed by: ORTHOPAEDIC SURGERY

## 2023-05-31 PROCEDURE — 1159F MED LIST DOCD IN RCRD: CPT | Mod: CPTII,S$GLB,, | Performed by: ORTHOPAEDIC SURGERY

## 2023-05-31 PROCEDURE — 1101F PT FALLS ASSESS-DOCD LE1/YR: CPT | Mod: CPTII,S$GLB,, | Performed by: ORTHOPAEDIC SURGERY

## 2023-05-31 PROCEDURE — 99213 PR OFFICE/OUTPT VISIT, EST, LEVL III, 20-29 MIN: ICD-10-PCS | Mod: S$GLB,,, | Performed by: ORTHOPAEDIC SURGERY

## 2023-05-31 PROCEDURE — 3288F FALL RISK ASSESSMENT DOCD: CPT | Mod: CPTII,S$GLB,, | Performed by: ORTHOPAEDIC SURGERY

## 2023-05-31 PROCEDURE — 1101F PR PT FALLS ASSESS DOC 0-1 FALLS W/OUT INJ PAST YR: ICD-10-PCS | Mod: CPTII,S$GLB,, | Performed by: ORTHOPAEDIC SURGERY

## 2023-05-31 PROCEDURE — 1125F AMNT PAIN NOTED PAIN PRSNT: CPT | Mod: CPTII,S$GLB,, | Performed by: ORTHOPAEDIC SURGERY

## 2023-05-31 RX ORDER — HYDROCODONE BITARTRATE AND ACETAMINOPHEN 7.5; 325 MG/1; MG/1
1 TABLET ORAL EVERY 6 HOURS PRN
Qty: 28 TABLET | Refills: 0 | Status: SHIPPED | OUTPATIENT
Start: 2023-05-31 | End: 2023-06-07

## 2023-05-31 RX ORDER — METHOCARBAMOL 500 MG/1
500 TABLET, FILM COATED ORAL 4 TIMES DAILY
Qty: 80 TABLET | Refills: 0 | Status: SHIPPED | OUTPATIENT
Start: 2023-05-31 | End: 2023-06-20

## 2023-05-31 NOTE — PROGRESS NOTES
Subjective:       Patient ID: Matt Martinez is a 67 y.o. male.    Chief Complaint: Pain of the Lumbar Spine ( Right L4-5 and L5-S1 transforaminal epidural steroid injection w/ Dr. Ma on 05/03/23, patient did not get relief from injection.)      History of Present Illness    Prior to meeting with the patient I reviewed the medical chart in [facility]. This included reviewing the previous progress notes from our office, review of the patient's last appointment with their primary care provider, review of any visits to the emergency room, and review of any pain management appointments or procedures.   Patient returns for further discussion.  Had epidural steroid injection and only gave him relief for 3 4 days.  Did not get any significant relief from radiofrequency ablation procedure which was performed prior to the epidural steroid injection.  No longer has any significant leg complaints his main complaint is back pain.    Current Medications  Current Outpatient Medications   Medication Sig Dispense Refill    amlodipine (NORVASC) 10 MG tablet Take 15 mg by mouth once daily.      diphenhydrAMINE-acetaminophen (TYLENOL PM)  mg Tab Take 1 tablet by mouth nightly as needed.      hydrochlorothiazide (HYDRODIURIL) 25 MG tablet Take 25 mg by mouth once daily.      HYDROcodone-acetaminophen (NORCO) 7.5-325 mg per tablet Take 1 tablet by mouth every 6 (six) hours as needed for Pain. 28 tablet 0    losartan (COZAAR) 50 MG tablet Take 50 mg by mouth 2 (two) times daily.      HYDROcodone-acetaminophen (NORCO) 7.5-325 mg per tablet Take 1 tablet by mouth every 6 (six) hours as needed for Pain. 28 tablet 0    methocarbamoL (ROBAXIN) 500 MG Tab Take 1 tablet (500 mg total) by mouth 4 (four) times daily. for 20 days 80 tablet 0     No current facility-administered medications for this visit.     Facility-Administered Medications Ordered in Other Visits   Medication Dose Route Frequency Provider Last Rate Last Admin    0.9%  NaCl  "infusion  500 mL Intravenous Continuous Matt Ma MD        lactated ringers infusion   Intravenous Once PRN Mara Jackson MD           Allergies  Review of patient's allergies indicates:   Allergen Reactions    Simvastatin Other (See Comments)     WEAKNESS       Past Medical History  Past Medical History:   Diagnosis Date    Cancer     SQUAMOUS CELL CARCINOMA R FA    Hepatitis a without hepatic coma     Hepatitis a without hepatic coma     Hep A during [place] War....."Ate monkey meat."    HTN (hypertension)     Sleep apnea        Surgical History  Past Surgical History:   Procedure Laterality Date    COLONOSCOPY  2011    colon polyps removed    COLONOSCOPY N/A 01/09/2017    Procedure: COLONOSCOPY;  Surgeon: Alexei Claudio MD;  Location: [facility];  Service: Endoscopy;  Laterality: N/A;    INJECTION OF ANESTHETIC AGENT AROUND MEDIAL BRANCH NERVES INNERVATING LUMBAR FACET JOINT Right 1/11/2023    Procedure: Block-nerve-medial branch-lumbar;  Surgeon: Matt Ma MD;  [facility];  Service: Pain Management;  Laterality: Right;  L3,4,5 MBB (morin)    INJECTION OF ANESTHETIC AGENT AROUND MEDIAL BRANCH NERVES INNERVATING LUMBAR FACET JOINT Right 1/25/2023    Procedure: Block-nerve-medial branch-lumbar;  Surgeon: Matt Ma MD;  [facility];  Service: Pain Management;  Laterality: Right;  L3,4,5 #2   DR MORIN    RADIOFREQUENCY ABLATION OF LUMBAR MEDIAL BRANCH NERVE AT SINGLE LEVEL Right 2/15/2023    Procedure: Radiofrequency Ablation, Nerve, Spinal, Lumbar, Medial Branch, L3, 4 ,5;  Surgeon: Matt Ma MD;  Location: [facility] OR;  Service: Pain Management;  Laterality: Right;    TONSILLECTOMY      TRANSFORAMINAL EPIDURAL INJECTION OF STEROID Right 9/23/2022    Procedure: Injection,steroid,epidural,transforaminal approach;  Surgeon: Mara Jackson MD;  Location: [facility] OR;  Service: Pain Management;  Laterality: Right;  L3-L4, L4-L5, L5-S1    TRANSFORAMINAL EPIDURAL INJECTION OF STEROID Right 11/21/2022 "    Procedure: INJECTION, STEROID, EPIDURAL, TRANSFORAMINAL APPROACH  RIGHT L3-4;  Surgeon: Mara Jackson MD;  Location: [facility] OR;  Service: Pain Management;  Laterality: Right;    TRANSFORAMINAL EPIDURAL INJECTION OF STEROID Right 5/3/2023    Procedure: Injection,steroid,epidural,transforaminal approach;  Surgeon: Matt Ma MD;  Location: [facility] OR;  Service: Pain Management;  Laterality: Right;  L4-5 and L5-s1       Family History:   Family History   Problem Relation Age of Onset    Pancreatic cancer Mother     Colon cancer Brother         unsure of age of diagnosis    Crohn's disease Neg Hx     Ulcerative colitis Neg Hx        Social History:   Social History     Socioeconomic History    Marital status:    Tobacco Use    Smoking status: Never    Smokeless tobacco: Never   Substance and Sexual Activity    Alcohol use: Yes     Comment: socially    Drug use: No    Sexual activity: Yes     Partners: Female       Hospitalization/Major Diagnostic Procedure:     Review of Systems     General/Constitutional:  Chills denies. Fatigue denies. Fever denies. Weight gain denies. Weight loss denies.    Respiratory:  Shortness of breath denies.    Cardiovascular:  Chest pain denies.    Gastrointestinal:  Constipation denies. Diarrhea denies. Nausea denies. Vomiting denies.     Hematology:  Easy bruising denies. Prolonged bleeding denies.     Genitourinary:  Frequent urination denies. Pain in lower back denies. Painful urination denies.     Musculoskeletal:  See HPI for details    Skin:  Rash denies.    Neurologic:  Dizziness denies. Gait abnormalities denies. Seizures denies. Tingling/Numbess denies.    Psychiatric:  Anxiety denies. Depressed mood denies.     Objective:   Vital Signs: There were no vitals filed for this visit.     Physical Exam      General Examination:     Constitutional: The patient is alert and oriented to lace person and time. Mood is pleasant.     Head/Face: Normal facial features normal  eyebrows    Eyes: Normal extraocular motion bilaterally    Lungs: Respirations are equal and unlabored    Gait is coordinated.    Cardiovascular: There are no swelling or varicosities present.    Lymphatic: Negative for adenopathy    Skin: Normal    Neurological: Level of consciousness normal. Oriented to place person and time and situation    Psychiatric: Oriented to time place person and situation    Straight leg raising maneuvers are negative lumbar range of motion limited   X-ray interpretation:  Prior MRI results were discussed    Assessment:       1. Facet arthritis of lumbar region    2. Lumbar foraminal stenosis    3. Lumbar radiculopathy    4. Lumbar degenerative disc disease    5. Protrusion of lumbar intervertebral disc        Plan:       Matt was seen today for pain.    Diagnoses and all orders for this visit:    Facet arthritis of lumbar region  -     methocarbamoL (ROBAXIN) 500 MG Tab; Take 1 tablet (500 mg total) by mouth 4 (four) times daily. for 20 days    Lumbar foraminal stenosis  -     methocarbamoL (ROBAXIN) 500 MG Tab; Take 1 tablet (500 mg total) by mouth 4 (four) times daily. for 20 days    Lumbar radiculopathy    Lumbar degenerative disc disease  -     methocarbamoL (ROBAXIN) 500 MG Tab; Take 1 tablet (500 mg total) by mouth 4 (four) times daily. for 20 days    Protrusion of lumbar intervertebral disc  -     methocarbamoL (ROBAXIN) 500 MG Tab; Take 1 tablet (500 mg total) by mouth 4 (four) times daily. for 20 days    Other orders  -     HYDROcodone-acetaminophen (NORCO) 7.5-325 mg per tablet; Take 1 tablet by mouth every 6 (six) hours as needed for Pain.         Follow up in about 3 months (around 8/31/2023), or if symptoms worsen or fail to improve.    Prior MRI findings were discussed in detail has facet arthritis foraminal and central stenosis and disc degeneration.  Main complaint is residual back pain likely due to degenerative disc disease since he did respond to radiofrequency  ablation procedure.  He does not have any further continued symptoms of leg pain he wishes to be treated nonsurgically and I would agree since he is 396 lb in weight and would have increased risk associated with a lumbar fusion     Short-term muscle relaxer and pain medication prescribed which I think is reasonable.  If patient needs long-term are in definite opiate medication for periodic pain will referred to pain management for medication management doctor ronaldo  Treatment options were discussed with regards to the nature of the medical condition. Conservative pain intervention and surgical options were discussed in detail. The probability of success of each separate treatment option was discussed. The patient expressed a clear understanding of the treatment options. With regards to surgery, the procedure risk, benefits, complications, and outcomes were discussed. No guarantees were given with regards to surgical outcome.   The risk of complications, morbidity, and mortality of patient management decisions have been made at the time of this visit. These are associated with the patient's problems, diagnostic procedures and treatment options. This includes the possible management options selected and those considered but not selected by the patient after shared medical decision making we discussed with the patient.     This note was created using Dragon voice recognition software that occasionally misinterpreted phrases or words.

## 2023-07-07 DIAGNOSIS — M47.816 FACET ARTHRITIS OF LUMBAR REGION: Primary | ICD-10-CM

## 2023-07-07 DIAGNOSIS — M51.36 LUMBAR DEGENERATIVE DISC DISEASE: ICD-10-CM

## 2023-07-10 RX ORDER — HYDROCODONE BITARTRATE AND ACETAMINOPHEN 7.5; 325 MG/1; MG/1
1 TABLET ORAL EVERY 6 HOURS PRN
Qty: 28 TABLET | Refills: 0 | Status: SHIPPED | OUTPATIENT
Start: 2023-07-10 | End: 2024-03-19

## 2023-07-10 NOTE — TELEPHONE ENCOUNTER
He since patient's last office visit on May 31st, he has had improvement in his leg symptoms but continues to have at times severe low back pain.  He has failed interventional pain management with radiofrequency ablation of the facet joints.  He continues to have discogenic pain.    He is a poor surgical candidate at 396 lb for lumbar fusion.  If he was going to require long-term medication for management of his chronic pain although he does not receive frequent prescriptions from our office we would like to refer him to Pain Management for medications.    I did refill his [place] prescribed at his last visit almost a month and half ago but going forward, we will not continue to prescribe pain medication for his chronic pain.

## 2023-08-16 ENCOUNTER — TELEPHONE (OUTPATIENT)
Dept: ORTHOPEDICS | Facility: CLINIC | Age: 68
End: 2023-08-16

## 2023-08-16 NOTE — TELEPHONE ENCOUNTER
----- Message from Juan José Brenner sent at 8/16/2023  1:38 PM CDT -----  Phone #: [phone]  Patient is requesting a refill of methocarbamoL (ROBAXIN) 500 MG Tab                            Pharmacy:   [facility] - [facility] [id number]  [id number] [address]  [place] [address]  Phone: [phone] Fax: [phone]

## 2023-08-16 NOTE — TELEPHONE ENCOUNTER
----- Message from Juan José Brenner sent at 8/16/2023  1:33 PM CDT -----  Phone #: [phone]  Patient is requesting a refill of   methocarbamoL (ROBAXIN) 500 MG Tab                    Pharmacy:   [facility] - [facility] [id number] [address]  [id number] [address]  [place] [address]  Phone: [phone] Fax: [phone]

## 2023-08-16 NOTE — TELEPHONE ENCOUNTER
"Left VM to Convey this information that was stated to him at refill request 07/10/23.             "Please let the patient know that I refilled his pain medication, however since he is not a surgical candidate, if he continues to require medications for pain that he will be referred to a pain management doctor for medication management."  "

## 2023-08-16 NOTE — TELEPHONE ENCOUNTER
Spoke to patient. Advised him that we would be sending a referral to pain management for medication management. Patient verbalized understanding.

## 2023-08-16 NOTE — TELEPHONE ENCOUNTER
----- Message from Juan José Brenner sent at 8/16/2023 11:37 AM CDT -----  Phone #: [phone]  Patient is requesting a refill of HYDROcodone-acetaminophen (NORCO) 7.5-325 mg per tablet                  Pharmacy:   [facility] - [facility] [address] [address]  [address] [address] [address]  Phone: [phone] Fax: [phone]

## 2024-03-19 ENCOUNTER — OFFICE VISIT (OUTPATIENT)
Dept: FAMILY MEDICINE | Facility: CLINIC | Age: 69
End: 2024-03-19
Payer: MEDICARE

## 2024-03-19 VITALS
DIASTOLIC BLOOD PRESSURE: 72 MMHG | BODY MASS INDEX: 45.1 KG/M2 | WEIGHT: 315 LBS | SYSTOLIC BLOOD PRESSURE: 126 MMHG | HEART RATE: 83 BPM | HEIGHT: 70 IN

## 2024-03-19 DIAGNOSIS — M51.16 LUMBAR DISC DISEASE WITH RADICULOPATHY: Primary | ICD-10-CM

## 2024-03-19 DIAGNOSIS — I73.9 VASCULAR CLAUDICATION: ICD-10-CM

## 2024-03-19 DIAGNOSIS — E66.01 MORBID (SEVERE) OBESITY DUE TO EXCESS CALORIES: ICD-10-CM

## 2024-03-19 DIAGNOSIS — M15.9 POLYARTICULAR OSTEOARTHRITIS: ICD-10-CM

## 2024-03-19 DIAGNOSIS — E78.2 MIXED HYPERLIPIDEMIA: ICD-10-CM

## 2024-03-19 DIAGNOSIS — M46.96 UNSPECIFIED INFLAMMATORY SPONDYLOPATHY, LUMBAR REGION: ICD-10-CM

## 2024-03-19 DIAGNOSIS — M51.9 LUMBAR DISC DISEASE: ICD-10-CM

## 2024-03-19 DIAGNOSIS — I10 PRIMARY HYPERTENSION: ICD-10-CM

## 2024-03-19 PROCEDURE — 3078F DIAST BP <80 MM HG: CPT | Mod: CPTII,S$GLB,, | Performed by: FAMILY MEDICINE

## 2024-03-19 PROCEDURE — 1101F PT FALLS ASSESS-DOCD LE1/YR: CPT | Mod: CPTII,S$GLB,, | Performed by: FAMILY MEDICINE

## 2024-03-19 PROCEDURE — 99204 OFFICE O/P NEW MOD 45 MIN: CPT | Mod: S$GLB,,, | Performed by: FAMILY MEDICINE

## 2024-03-19 PROCEDURE — 3288F FALL RISK ASSESSMENT DOCD: CPT | Mod: CPTII,S$GLB,, | Performed by: FAMILY MEDICINE

## 2024-03-19 PROCEDURE — 1159F MED LIST DOCD IN RCRD: CPT | Mod: CPTII,S$GLB,, | Performed by: FAMILY MEDICINE

## 2024-03-19 PROCEDURE — 1125F AMNT PAIN NOTED PAIN PRSNT: CPT | Mod: CPTII,S$GLB,, | Performed by: FAMILY MEDICINE

## 2024-03-19 PROCEDURE — 3074F SYST BP LT 130 MM HG: CPT | Mod: CPTII,S$GLB,, | Performed by: FAMILY MEDICINE

## 2024-03-19 PROCEDURE — 3008F BODY MASS INDEX DOCD: CPT | Mod: CPTII,S$GLB,, | Performed by: FAMILY MEDICINE

## 2024-03-19 RX ORDER — HYDROCODONE BITARTRATE AND ACETAMINOPHEN 7.5; 325 MG/1; MG/1
1 TABLET ORAL EVERY 8 HOURS PRN
Qty: 60 TABLET | Refills: 0 | Status: SHIPPED | OUTPATIENT
Start: 2024-03-19

## 2024-03-19 RX ORDER — METOPROLOL SUCCINATE 25 MG/1
25 TABLET, EXTENDED RELEASE ORAL DAILY
COMMUNITY
Start: 2023-11-07

## 2024-03-19 RX ORDER — ROSUVASTATIN CALCIUM 10 MG/1
10 TABLET, COATED ORAL DAILY
COMMUNITY
Start: 2023-11-07

## 2024-03-19 RX ORDER — METHOCARBAMOL 750 MG/1
750 TABLET, FILM COATED ORAL 3 TIMES DAILY
Qty: 90 TABLET | Refills: 2 | Status: SHIPPED | OUTPATIENT
Start: 2024-03-19 | End: 2024-03-29

## 2024-03-20 PROBLEM — M15.9 POLYARTICULAR OSTEOARTHRITIS: Status: ACTIVE | Noted: 2024-03-20

## 2024-03-20 PROBLEM — M51.9 LUMBAR DISC DISEASE: Status: ACTIVE | Noted: 2024-03-20

## 2024-03-20 PROBLEM — I10 PRIMARY HYPERTENSION: Status: ACTIVE | Noted: 2024-03-20

## 2024-03-20 PROBLEM — E78.2 MIXED HYPERLIPIDEMIA: Status: ACTIVE | Noted: 2024-03-20

## 2024-03-20 PROBLEM — E66.01 MORBID (SEVERE) OBESITY DUE TO EXCESS CALORIES: Status: ACTIVE | Noted: 2024-03-20

## 2024-03-20 PROBLEM — M46.96 UNSPECIFIED INFLAMMATORY SPONDYLOPATHY, LUMBAR REGION: Status: ACTIVE | Noted: 2024-03-20

## 2024-03-21 NOTE — PROGRESS NOTES
SUBJECTIVE:    Patient ID: Matt Martinez is a 68 y.o. male.    Chief Complaint: Establish Care (Brought bottles, back pain off//on, patient is fasting for blood work, declined PNA, abc )    68-year-old male here for a new patient visit.  He retired in September 2023 from an [employer] job    Chronic back pain, status post 3 rounds of epidural steroid injections and rhizotomy knees with Dr. Ma, his right calf gets shooting pains intermittently.    Has seen Dr. Victor orthopedics.  He is unable to walk greater than 100 yd then he has to sit down to relieve the pain.  He is intolerant of Advil, he gets back pain from walking    He tries to stay active doing fishing and hunting.  He has a pool to swim in.    Has some shortness a breath and dyspnea on exertion.    He has never smoked, occasionally drinks alcohol.    Mother has diabetes    [facility] have done echocardiogram with her normal stress testing which is normal  Hypertension-on 4 medications    2017-colonoscopy with Dr. Claudio RTC 10 years    Takes Tylenol p.m. 2 tablets per night    Hyperlipidemia, on rosuvastatin 10 mg which is working well Lipitor caused myalgias        Back Pain  This is a chronic problem. The current episode started more than 1 year ago. The problem occurs 2 to 4 times per day. The problem has been waxing and waning since onset. The pain is present in the sacro-iliac. The quality of the pain is described as aching. The pain radiates to the right knee. The pain is at a severity of 10/10. The pain is severe. The pain is Worse during the day. The symptoms are aggravated by standing. Stiffness is present All day. Associated symptoms include leg pain, numbness and weakness. Pertinent negatives include no abdominal pain, bladder incontinence, bowel incontinence, chest pain, dysuria, fever, headaches, paresis, paresthesias, pelvic pain, perianal numbness, tingling or weight loss. Risk factors include history of  "cancer, lack of exercise and obesity. The treatment provided moderate relief.       No visits with results within 6 Month(s) from this visit.   Latest known visit with results is:   Lab Visit on 06/24/2020   Component Date Value Ref Range Status    SARS-CoV2 (COVID-19) Qualitative P* 06/24/2020 Detected (A)  Not Detected Final       Past Medical History:   Diagnosis Date    Cancer     SQUAMOUS CELL CARCINOMA R FA    Hepatitis a without hepatic coma     Hepatitis a without hepatic coma     Hep A during [place] War....."Ate monkey meat."    HTN (hypertension)     Sleep apnea      Social History     Socioeconomic History    Marital status:    Tobacco Use    Smoking status: Never    Smokeless tobacco: Never   Substance and Sexual Activity    Alcohol use: Yes     Comment: socially    Drug use: No    Sexual activity: Yes     Partners: Female     Social Determinants of Health     Financial Resource Strain: Low Risk  (3/17/2024)    Overall Financial Resource Strain (CARDIA)     Difficulty of Paying Living Expenses: Not very hard   Food Insecurity: No Food Insecurity (3/17/2024)    Hunger Vital Sign     Worried About Running Out of Food in the Last Year: Never true     Ran Out of Food in the Last Year: Never true   Transportation Needs: No Transportation Needs (3/17/2024)    PRAPARE - Transportation     Lack of Transportation (Medical): No     Lack of Transportation (Non-Medical): No   Physical Activity: Insufficiently Active (3/17/2024)    Exercise Vital Sign     Days of Exercise per Week: 1 day     Minutes of Exercise per Session: 10 min   Stress: No Stress Concern Present (3/17/2024)    [employer] - Occupational Stress Questionnaire     Feeling of Stress : Not at all   Social Connections: Unknown (3/17/2024)    Social Connection and Isolation Panel [NHANES]     Frequency of Communication with Friends and Family: More than three times a week     Frequency of Social Gatherings with " Friends and Family: Once a week     Active Member of Clubs or Organizations: No     Attends Club or Organization Meetings: Patient declined     Marital Status:    Housing Stability: Low Risk  (3/17/2024)    Housing Stability Vital Sign     Unable to Pay for Housing in the Last Year: No     Number of Places Lived in the Last Year: 1     Unstable Housing in the Last Year: No     Past Surgical History:   Procedure Laterality Date    COLONOSCOPY  2011    colon polyps removed    COLONOSCOPY N/A 01/09/2017    Procedure: COLONOSCOPY;  Surgeon: Alexei Claudio MD;  Location: [facility];  Service: Endoscopy;  Laterality: N/A;    INJECTION OF ANESTHETIC AGENT AROUND MEDIAL BRANCH NERVES INNERVATING LUMBAR FACET JOINT Right 1/11/2023    Procedure: Block-nerve-medial branch-lumbar;  Surgeon: Matt Ma MD;  Location: [facility];  Service: Pain Management;  Laterality: Right;  L3,4,5 MBB (oneyda)    INJECTION OF ANESTHETIC AGENT AROUND MEDIAL BRANCH NERVES INNERVATING LUMBAR FACET JOINT Right 1/25/2023    Procedure: Block-nerve-medial branch-lumbar;  Surgeon: Matt Ma MD;  Location: [facility];  Service: Pain Management;  Laterality: Right;  L3,4,5 #2   DR MORIN    RADIOFREQUENCY ABLATION OF LUMBAR MEDIAL BRANCH NERVE AT SINGLE LEVEL Right 2/15/2023    Procedure: Radiofrequency Ablation, Nerve, Spinal, Lumbar, Medial Branch, L3, 4 ,5;  Surgeon: Matt Ma MD;  Location: [facility];  Service: Pain Management;  Laterality: Right;    TONSILLECTOMY      TRANSFORAMINAL EPIDURAL INJECTION OF STEROID Right 9/23/2022    Procedure: Injection,steroid,epidural,transforaminal approach;  Surgeon: Mara Jackson MD;  Location: [facility] OR;  Service: Pain Management;  Laterality: Right;  L3-L4, L4-L5, L5-S1    TRANSFORAMINAL EPIDURAL INJECTION OF STEROID Right 11/21/2022    Procedure: INJECTION, STEROID, EPIDURAL, TRANSFORAMINAL APPROACH  RIGHT L3-4;  Surgeon: Mara Jackson MD;  Location: [facility];  Service: Pain Management;  Laterality:  Right;    TRANSFORAMINAL EPIDURAL INJECTION OF STEROID Right 5/3/2023    Procedure: Injection,steroid,epidural,transforaminal approach;  Surgeon: Matt Ma MD;  Location: [facility];  Service: Pain Management;  Laterality: Right;  L4-5 and L5-s1     Family History   Problem Relation Age of Onset    Pancreatic cancer Mother     Colon cancer Brother         unsure of age of diagnosis    Crohn's disease Neg Hx     Ulcerative colitis Neg Hx        The CVD Risk score (ERIK'Agostino, et al., 2008) failed to calculate for the following reasons:    Cannot find a previous HDL lab    Cannot find a previous total cholesterol lab    All of your core healthy metrics are met.      Review of patient's allergies indicates:   Allergen Reactions    Simvastatin Other (See Comments)     WEAKNESS       Current Outpatient Medications:     amlodipine (NORVASC) 10 MG tablet, Take 15 mg by mouth once daily., Disp: , Rfl:     diphenhydrAMINE-acetaminophen (TYLENOL PM)  mg Tab, Take 1 tablet by mouth nightly as needed., Disp: , Rfl:     hydrochlorothiazide (HYDRODIURIL) 25 MG tablet, Take 25 mg by mouth once daily., Disp: , Rfl:     losartan (COZAAR) 50 MG tablet, Take 100 mg by mouth once daily., Disp: , Rfl:     metoprolol succinate (TOPROL-XL) 25 MG 24 hr tablet, 25 mg once daily. 12.5 mg daily, Disp: , Rfl:     rosuvastatin (CRESTOR) 10 MG tablet, Take 10 mg by mouth once daily., Disp: , Rfl:     HYDROcodone-acetaminophen (NORCO) 7.5-325 mg per tablet, Take 1 tablet by mouth every 8 (eight) hours as needed for Pain., Disp: 60 tablet, Rfl: 0    methocarbamoL (ROBAXIN) 750 MG Tab, Take 1 tablet (750 mg total) by mouth 3 (three) times daily. for 10 days, Disp: 90 tablet, Rfl: 2  No current facility-administered medications for this visit.    Facility-Administered Medications Ordered in Other Visits:     0.9%  NaCl infusion, 500 mL, Intravenous, Continuous, Matt Ma MD    lactated ringers infusion, , Intravenous, Once PRN, Mara Jackson  "MD BECKY    Review of Systems   Constitutional:  Negative for fever and weight loss.   Cardiovascular:  Negative for chest pain.   Gastrointestinal:  Negative for abdominal pain and bowel incontinence.   Genitourinary:  Negative for bladder incontinence, dysuria, hematuria and pelvic pain.   Musculoskeletal:  Positive for back pain and leg pain.   Neurological:  Positive for weakness and numbness. Negative for tingling, headaches and paresthesias.           Objective:      Vitals:    03/19/24 0923   BP: 126/72   Pulse: 83   Weight: (!) 190.5 kg (420 lb)   Height: 5' 10" (1.778 m)     Physical Exam  Vitals and nursing note reviewed.   Constitutional:       General: He is not in acute distress.     Appearance: He is well-developed. He is obese. He is not toxic-appearing.   HENT:      Head: Normocephalic and atraumatic.      Right Ear: Tympanic membrane and external ear normal.      Left Ear: Tympanic membrane and external ear normal.      Nose: Nose normal.      Mouth/Throat:      Pharynx: Oropharynx is clear.   Eyes:      Pupils: Pupils are equal, round, and reactive to light.   Neck:      Thyroid: No thyromegaly.      Vascular: No carotid bruit.   Cardiovascular:      Rate and Rhythm: Normal rate and regular rhythm.      Heart sounds: Normal heart sounds. No murmur heard.  Pulmonary:      Effort: Pulmonary effort is normal.      Breath sounds: Normal breath sounds. No wheezing or rales.   Abdominal:      General: Bowel sounds are normal. There is no distension.      Palpations: Abdomen is soft.      Tenderness: There is no abdominal tenderness.   Musculoskeletal:         General: No tenderness or deformity. Normal range of motion.      Cervical back: Normal range of motion and neck supple.      Lumbar back: Normal. No spasms.      Comments: Bends 90 degrees at  waist, shoulders and knees good range of motion, no pitting edema to lower extremities   Lymphadenopathy:      Cervical: No cervical adenopathy.   Skin:     " General: Skin is warm and dry.      Findings: No rash.   Neurological:      Mental Status: He is alert and oriented to person, place, and time.      Cranial Nerves: No cranial nerve deficit.      Coordination: Coordination normal.      Gait: Gait normal.   Psychiatric:         Mood and Affect: Mood normal.         Behavior: Behavior normal.         Thought Content: Thought content normal.         Judgment: Judgment normal.           Assessment:       1. Lumbar disc disease with radiculopathy    2. Lumbar disc disease    3. Mixed hyperlipidemia    4. Primary hypertension    5. Polyarticular osteoarthritis    6. Morbid (severe) obesity due to excess calories    7. Unspecified inflammatory spondylopathy, lumbar region    8. Vascular claudication         Plan:       Lumbar disc disease with radiculopathy  -     HYDROcodone-acetaminophen (NORCO) 7.5-325 mg per tablet; Take 1 tablet by mouth every 8 (eight) hours as needed for Pain.  Dispense: 60 tablet; Refill: 0  -     methocarbamoL (ROBAXIN) 750 MG Tab; Take 1 tablet (750 mg total) by mouth 3 (three) times daily. for 10 days  Dispense: 90 tablet; Refill: 2  Refill Norco 7.5-325 mg t.i.d..  Lumbar disc disease  Status post epidural steroid injections and rhizotomy  Mixed hyperlipidemia  Continue rosuvastatin  Primary hypertension  Blood pressure well controlled  Polyarticular osteoarthritis    Morbid (severe) obesity due to excess calories    Unspecified inflammatory spondylopathy, lumbar region    Vascular claudication  [facility]    Follow up in about 6 months (around 9/19/2024).        3/20/2024 Yonis Puckett

## 2024-07-15 ENCOUNTER — TELEPHONE (OUTPATIENT)
Dept: FAMILY MEDICINE | Facility: CLINIC | Age: 69
End: 2024-07-15
Payer: MEDICARE

## 2024-07-15 NOTE — TELEPHONE ENCOUNTER
Spoke with patient and let him know we received medical records and he can come pick them up any time.

## 2024-08-26 DIAGNOSIS — M51.16 LUMBAR DISC DISEASE WITH RADICULOPATHY: ICD-10-CM

## 2024-08-26 RX ORDER — METHOCARBAMOL 750 MG/1
750 TABLET, FILM COATED ORAL 3 TIMES DAILY
Qty: 90 TABLET | Refills: 2 | Status: SHIPPED | OUTPATIENT
Start: 2024-08-26

## 2024-08-26 RX ORDER — METHOCARBAMOL 750 MG/1
750 TABLET, FILM COATED ORAL 3 TIMES DAILY
COMMUNITY
Start: 2024-05-17 | End: 2024-08-26 | Stop reason: SDUPTHER

## 2024-08-26 RX ORDER — HYDROCODONE BITARTRATE AND ACETAMINOPHEN 7.5; 325 MG/1; MG/1
1 TABLET ORAL EVERY 8 HOURS PRN
Qty: 60 TABLET | Refills: 0 | Status: SHIPPED | OUTPATIENT
Start: 2024-08-26

## 2024-08-26 NOTE — TELEPHONE ENCOUNTER
----- Message from Sofi Silvestre sent at 8/26/2024 11:10 AM CDT -----  Pt wife erica is calling and he needs refills on methocarbamol and hydrocodone. Leaving for a cruise this weekend   [facility] meds  [phone]  
Set up if okay to send. Patient takes PRN. Last sent 3/19 at [facility]. Scheduled 9/19. Robaxin had end date.   
Attending and PA/NP shared services statement (NON-critical care):

## 2024-09-05 ENCOUNTER — TELEPHONE (OUTPATIENT)
Dept: FAMILY MEDICINE | Facility: CLINIC | Age: 69
End: 2024-09-05
Payer: MEDICARE

## 2024-09-05 DIAGNOSIS — I10 PRIMARY HYPERTENSION: ICD-10-CM

## 2024-09-05 DIAGNOSIS — Z79.899 ENCOUNTER FOR LONG-TERM (CURRENT) USE OF OTHER MEDICATIONS: Primary | ICD-10-CM

## 2024-09-05 DIAGNOSIS — E78.2 MIXED HYPERLIPIDEMIA: ICD-10-CM

## 2024-09-05 DIAGNOSIS — Z12.5 SPECIAL SCREENING FOR MALIGNANT NEOPLASM OF PROSTATE: ICD-10-CM

## 2024-09-05 DIAGNOSIS — E66.01 MORBID (SEVERE) OBESITY DUE TO EXCESS CALORIES: ICD-10-CM

## 2024-09-19 ENCOUNTER — TELEPHONE (OUTPATIENT)
Dept: FAMILY MEDICINE | Facility: CLINIC | Age: 69
End: 2024-09-19

## 2024-09-19 ENCOUNTER — OFFICE VISIT (OUTPATIENT)
Dept: FAMILY MEDICINE | Facility: CLINIC | Age: 69
End: 2024-09-19
Payer: MEDICARE

## 2024-09-19 VITALS
RESPIRATION RATE: 18 BRPM | WEIGHT: 315 LBS | DIASTOLIC BLOOD PRESSURE: 72 MMHG | BODY MASS INDEX: 45.1 KG/M2 | OXYGEN SATURATION: 95 % | HEART RATE: 80 BPM | SYSTOLIC BLOOD PRESSURE: 132 MMHG | HEIGHT: 70 IN

## 2024-09-19 DIAGNOSIS — R73.01 IFG (IMPAIRED FASTING GLUCOSE): ICD-10-CM

## 2024-09-19 DIAGNOSIS — R31.9 HEMATURIA, UNSPECIFIED TYPE: ICD-10-CM

## 2024-09-19 DIAGNOSIS — R31.9 HEMATURIA, UNSPECIFIED TYPE: Primary | ICD-10-CM

## 2024-09-19 DIAGNOSIS — E78.2 MIXED HYPERLIPIDEMIA: Primary | ICD-10-CM

## 2024-09-19 DIAGNOSIS — I10 PRIMARY HYPERTENSION: ICD-10-CM

## 2024-09-19 DIAGNOSIS — M51.16 LUMBAR DISC DISEASE WITH RADICULOPATHY: ICD-10-CM

## 2024-09-19 LAB
BILIRUB UR QL STRIP: NEGATIVE
GLUCOSE UR QL STRIP: NEGATIVE
KETONES UR QL STRIP: NEGATIVE
LEUKOCYTE ESTERASE UR QL STRIP: NEGATIVE
PH, POC UA: 5.5
POC BLOOD, URINE: POSITIVE
POC NITRATES, URINE: NEGATIVE
PROT UR QL STRIP: NEGATIVE
SP GR UR STRIP: 1.03 (ref 1–1.03)
UROBILINOGEN UR STRIP-ACNC: NORMAL (ref 0.3–2.2)

## 2024-09-19 PROCEDURE — 81003 URINALYSIS AUTO W/O SCOPE: CPT | Mod: QW,S$GLB,, | Performed by: PHYSICIAN ASSISTANT

## 2024-09-19 PROCEDURE — 3008F BODY MASS INDEX DOCD: CPT | Mod: CPTII,S$GLB,, | Performed by: PHYSICIAN ASSISTANT

## 2024-09-19 PROCEDURE — 3066F NEPHROPATHY DOC TX: CPT | Mod: CPTII,S$GLB,, | Performed by: PHYSICIAN ASSISTANT

## 2024-09-19 PROCEDURE — 1126F AMNT PAIN NOTED NONE PRSNT: CPT | Mod: CPTII,S$GLB,, | Performed by: PHYSICIAN ASSISTANT

## 2024-09-19 PROCEDURE — 3078F DIAST BP <80 MM HG: CPT | Mod: CPTII,S$GLB,, | Performed by: PHYSICIAN ASSISTANT

## 2024-09-19 PROCEDURE — 1159F MED LIST DOCD IN RCRD: CPT | Mod: CPTII,S$GLB,, | Performed by: PHYSICIAN ASSISTANT

## 2024-09-19 PROCEDURE — 3288F FALL RISK ASSESSMENT DOCD: CPT | Mod: CPTII,S$GLB,, | Performed by: PHYSICIAN ASSISTANT

## 2024-09-19 PROCEDURE — 1101F PT FALLS ASSESS-DOCD LE1/YR: CPT | Mod: CPTII,S$GLB,, | Performed by: PHYSICIAN ASSISTANT

## 2024-09-19 PROCEDURE — 3061F NEG MICROALBUMINURIA REV: CPT | Mod: CPTII,S$GLB,, | Performed by: PHYSICIAN ASSISTANT

## 2024-09-19 PROCEDURE — 99214 OFFICE O/P EST MOD 30 MIN: CPT | Mod: S$GLB,,, | Performed by: PHYSICIAN ASSISTANT

## 2024-09-19 PROCEDURE — 3075F SYST BP GE 130 - 139MM HG: CPT | Mod: CPTII,S$GLB,, | Performed by: PHYSICIAN ASSISTANT

## 2024-09-19 RX ORDER — ASPIRIN 81 MG/1
TABLET ORAL
COMMUNITY
Start: 2024-01-01

## 2024-09-19 NOTE — TELEPHONE ENCOUNTER
Today's urine was collected for culture instead of cytology, specimen is now not valid. Will have to recollect for cytology. The cytology orders and the Hgb A1c orders on the visit will have to be deleted. We can reorder the cytology with that order for YULIYA/[facility] since he has to recollect anyway

## 2024-09-19 NOTE — PROGRESS NOTES
SUBJECTIVE:    Patient ID: Matt Martinez is a 69 y.o. male.    Chief Complaint: Follow-up (Brought bottles// no refills needed// pt states he is still having back pain level 4 going on for a very long time //pt refused flu vaccine //last taken Norco was 4 days ago )    HPI  Patient is a 69 year old male who presents to the clinic today for his 6 month follow up.Patient complains of chronic lower back with no radiation or associated weakness/numbness. He is currently on robaxin 750mg and norco daily, which have been helping. He had epidural steroid injections and rhizotomy of knee. He currently follows with Dr. Victor for further management. Patient mentions one episode of gross hematuria when he was at a cruise earlier this month. His UA does show RBC casts. He denies history of kidney stones, urinary frequency, urinary urgency, or dysuria.     Office Visit on 09/19/2024   Component Date Value Ref Range Status    POC Blood, Urine 09/19/2024 Positive (A)  Negative Final    POC Bilirubin, Urine 09/19/2024 Negative  Negative Final    POC Urobilinogen, Urine 09/19/2024 normal  0.3 - 2.2 Final    POC Ketones, Urine 09/19/2024 Negative  Negative Final    POC Protein, Urine 09/19/2024 Negative  Negative Final    POC Nitrates, Urine 09/19/2024 Negative  Negative Final    POC Glucose, Urine 09/19/2024 Negative  Negative Final    pH, UA 09/19/2024 5.5   Final    POC Specific Gravity, Urine 09/19/2024 1.030 (A)  1.003 - 1.029 Final    POC Leukocytes, Urine 09/19/2024 Negative  Negative Final   Telephone on 09/05/2024   Component Date Value Ref Range Status    Cholesterol 09/16/2024 161  <200 mg/dL Final    HDL 09/16/2024 45  > OR = 40 mg/dL Final    Triglycerides 09/16/2024 138  <150 mg/dL Final    LDL Cholesterol 09/16/2024 92  mg/dL (calc) Final    HDL/Cholesterol Ratio 09/16/2024 3.6  <5.0 (calc) Final    Non HDL Chol. (LDL+VLDL) 09/16/2024 116  <130 mg/dL (calc) Final    Creatinine, Urine 09/16/2024 164  20 - 320 mg/dL  Final    Microalb, Ur 09/16/2024 2.8  See Note: mg/dL Final    Microalb/Creat Ratio 09/16/2024 17  <30 mg/g creat Final    Glucose 09/16/2024 130 (H)  65 - 99 mg/dL Final    BUN 09/16/2024 16  7 - 25 mg/dL Final    Creatinine 09/16/2024 0.94  0.70 - 1.35 mg/dL Final    eGFR 09/16/2024 88  > OR = 60 mL/min/1.73m2 Final    BUN/Creatinine Ratio 09/16/2024 SEE NOTE:  6 - 22 (calc) Final    Sodium 09/16/2024 141  135 - 146 mmol/L Final    Potassium 09/16/2024 3.9  3.5 - 5.3 mmol/L Final    Chloride 09/16/2024 105  98 - 110 mmol/L Final    CO2 09/16/2024 25  20 - 32 mmol/L Final    Calcium 09/16/2024 9.3  8.6 - 10.3 mg/dL Final    Total Protein 09/16/2024 7.0  6.1 - 8.1 g/dL Final    Albumin 09/16/2024 4.1  3.6 - 5.1 g/dL Final    Globulin, Total 09/16/2024 2.9  1.9 - 3.7 g/dL (calc) Final    Albumin/Globulin Ratio 09/16/2024 1.4  1.0 - 2.5 (calc) Final    Total Bilirubin 09/16/2024 0.6  0.2 - 1.2 mg/dL Final    Alkaline Phosphatase 09/16/2024 71  35 - 144 U/L Final    AST 09/16/2024 14  10 - 35 U/L Final    ALT 09/16/2024 18  9 - 46 U/L Final    PROSTATE SPECIFIC ANTIGEN, SCR - Q* 09/16/2024 2.35  < OR = 4.00 ng/mL Final    TSH w/reflex to FT4 09/16/2024 1.54  0.40 - 4.50 mIU/L Final    Color, UA 09/16/2024 YELLOW  YELLOW Final    Appearance, UA 09/16/2024 CLEAR  CLEAR Final    Specific Gravity, UA 09/16/2024 1.019  1.001 - 1.035 Final    pH, UA 09/16/2024 6.0  5.0 - 8.0 Final    Glucose, UA 09/16/2024 NEGATIVE  NEGATIVE Final    Bilirubin, UA 09/16/2024 NEGATIVE  NEGATIVE Final    Ketones, UA 09/16/2024 NEGATIVE  NEGATIVE Final    Occult Blood UA 09/16/2024 NEGATIVE  NEGATIVE Final    Protein, UA 09/16/2024 NEGATIVE  NEGATIVE Final    Nitrite, UA 09/16/2024 NEGATIVE  NEGATIVE Final    Leukocytes, UA 09/16/2024 NEGATIVE  NEGATIVE Final    WBC Casts, UA 09/16/2024 0-5  < OR = 5 /HPF Final    RBC Casts, UA 09/16/2024 3-10 (A)  < OR = 2 /HPF Final    Squam Epithel, UA 09/16/2024 0-5  < OR = 5 /HPF Final    Bacteria, UA  "09/16/2024 NONE SEEN  NONE SEEN /HPF Final    Hyaline Casts, UA 09/16/2024 0-5 (A)  NONE SEEN /LPF Final    Service Cmt: 09/16/2024 SEE COMMENT   Final    Reflexive Urine Culture 09/16/2024 SEE COMMENT   Final    WBC 09/16/2024 7.5  3.8 - 10.8 Thousand/uL Final    RBC 09/16/2024 4.63  4.20 - 5.80 Million/uL Final    Hemoglobin 09/16/2024 13.7  13.2 - 17.1 g/dL Final    Hematocrit 09/16/2024 42.6  38.5 - 50.0 % Final    MCV 09/16/2024 92.0  80.0 - 100.0 fL Final    MCH 09/16/2024 29.6  27.0 - 33.0 pg Final    MCHC 09/16/2024 32.2  32.0 - 36.0 g/dL Final    RDW 09/16/2024 12.8  11.0 - 15.0 % Final    Platelets 09/16/2024 300  140 - 400 Thousand/uL Final    MPV 09/16/2024 10.6  7.5 - 12.5 fL Final    Neutrophils, Abs 09/16/2024 4,733  1,500 - 7,800 cells/uL Final    Lymph # 09/16/2024 1,793  850 - 3,900 cells/uL Final    Mono # 09/16/2024 638  200 - 950 cells/uL Final    Eos # 09/16/2024 308  15 - 500 cells/uL Final    Baso # 09/16/2024 30  0 - 200 cells/uL Final    Neutrophils Relative 09/16/2024 63.1  % Final    Lymph % 09/16/2024 23.9  % Final    Mono % 09/16/2024 8.5  % Final    Eosinophil % 09/16/2024 4.1  % Final    Basophil % 09/16/2024 0.4  % Final       Past Medical History:   Diagnosis Date    Cancer     SQUAMOUS CELL CARCINOMA R FA    Hepatitis a without hepatic coma     Hepatitis a without hepatic coma     Hep A during [place] War....."Ate monkey meat."    HTN (hypertension)     Sleep apnea      Past Surgical History:   Procedure Laterality Date    COLONOSCOPY  2011    colon polyps removed    COLONOSCOPY N/A 01/09/2017    Procedure: COLONOSCOPY;  Surgeon: Alexei Claudio MD;  Location: [facility];  Service: Endoscopy;  Laterality: N/A;    INJECTION OF ANESTHETIC AGENT AROUND MEDIAL BRANCH NERVES INNERVATING LUMBAR FACET JOINT Right 1/11/2023    Procedure: Block-nerve-medial branch-lumbar;  Surgeon: Mtat Ma MD;  Location: [facility];  Service: Pain Management;  Laterality: Right;  L3,4,5 VICENTE abraham)    " INJECTION OF ANESTHETIC AGENT AROUND MEDIAL BRANCH NERVES INNERVATING LUMBAR FACET JOINT Right 1/25/2023    Procedure: Block-nerve-medial branch-lumbar;  Surgeon: Matt Ma MD;  Location: [facility] OR;  Service: Pain Management;  Laterality: Right;  L3,4,5 #2   DR MORIN    RADIOFREQUENCY ABLATION OF LUMBAR MEDIAL BRANCH NERVE AT SINGLE LEVEL Right 2/15/2023    Procedure: Radiofrequency Ablation, Nerve, Spinal, Lumbar, Medial Branch, L3, 4 ,5;  Surgeon: Matt Ma MD;  Location: [facility] OR;  Service: Pain Management;  Laterality: Right;    TONSILLECTOMY      TRANSFORAMINAL EPIDURAL INJECTION OF STEROID Right 9/23/2022    Procedure: Injection,steroid,epidural,transforaminal approach;  Surgeon: Mara Jackson MD;  Location: [facility] OR;  Service: Pain Management;  Laterality: Right;  L3-L4, L4-L5, L5-S1    TRANSFORAMINAL EPIDURAL INJECTION OF STEROID Right 11/21/2022    Procedure: INJECTION, STEROID, EPIDURAL, TRANSFORAMINAL APPROACH  RIGHT L3-4;  Surgeon: Mara Jackson MD;  Location: [facility] OR;  Service: Pain Management;  Laterality: Right;    TRANSFORAMINAL EPIDURAL INJECTION OF STEROID Right 5/3/2023    Procedure: Injection,steroid,epidural,transforaminal approach;  Surgeon: Matt Ma MD;  Location: [facility] OR;  Service: Pain Management;  Laterality: Right;  L4-5 and L5-s1     Family History   Problem Relation Name Age of Onset    Pancreatic cancer Mother      Colon cancer Brother          unsure of age of diagnosis    Crohn's disease Neg Hx      Ulcerative colitis Neg Hx         Marital Status:   Alcohol History:  reports current alcohol use.  Tobacco History:  reports that he has never smoked. He has never used smokeless tobacco.  Drug History:  reports no history of drug use.    Review of patient's allergies indicates:   Allergen Reactions    Simvastatin Other (See Comments)     WEAKNESS       Current Outpatient Medications:     amlodipine (NORVASC) 10 MG tablet, Take 15 mg by mouth once daily., Disp: , Rfl:      aspirin (ADULT LOW DOSE ASPIRIN) 81 MG EC tablet, , Disp: , Rfl:     diphenhydrAMINE-acetaminophen (TYLENOL PM)  mg Tab, Take 1 tablet by mouth nightly as needed., Disp: , Rfl:     hydrochlorothiazide (HYDRODIURIL) 25 MG tablet, Take 25 mg by mouth once daily., Disp: , Rfl:     HYDROcodone-acetaminophen (NORCO) 7.5-325 mg per tablet, Take 1 tablet by mouth every 8 (eight) hours as needed for Pain., Disp: 60 tablet, Rfl: 0    losartan (COZAAR) 50 MG tablet, Take 100 mg by mouth once daily., Disp: , Rfl:     methocarbamoL (ROBAXIN) 750 MG Tab, Take 1 tablet (750 mg total) by mouth 3 (three) times daily., Disp: 90 tablet, Rfl: 2    metoprolol succinate (TOPROL-XL) 25 MG 24 hr tablet, 25 mg once daily. 12.5 mg daily, Disp: , Rfl:     multivit-min/folic/vit K/lycop (MEN'S 50 PLUS DAILY FORMULA ORAL), , Disp: , Rfl:     rosuvastatin (CRESTOR) 10 MG tablet, Take 10 mg by mouth once daily., Disp: , Rfl:   No current facility-administered medications for this visit.    Facility-Administered Medications Ordered in Other Visits:     0.9%  NaCl infusion, 500 mL, Intravenous, Continuous, Matt Ma MD    lactated ringers infusion, , Intravenous, Once PRN, Mara Jackson MD    Review of Systems   Constitutional:  Negative for activity change and unexpected weight change.   HENT:  Negative for hearing loss, rhinorrhea and trouble swallowing.    Eyes:  Negative for discharge and visual disturbance.   Respiratory:  Negative for chest tightness and wheezing.    Cardiovascular:  Negative for chest pain and palpitations.   Gastrointestinal:  Negative for blood in stool, constipation, diarrhea and vomiting.   Endocrine: Negative for polydipsia and polyuria.   Genitourinary:  Negative for difficulty urinating, hematuria and urgency.   Musculoskeletal:  Positive for arthralgias. Negative for joint swelling and neck pain.   Neurological:  Negative for weakness and headaches.   Psychiatric/Behavioral:  Negative for confusion and  "dysphoric mood.           Objective:      Vitals:    09/19/24 1351   BP: 132/72   Pulse: 80   Resp: 18   SpO2: 95%   Weight: (!) 191.4 kg (422 lb)   Height: 5' 10" (1.778 m)     Physical Exam  Constitutional:       General: He is not in acute distress.     Appearance: Normal appearance. He is obese. He is not toxic-appearing.   HENT:      Head: Normocephalic and atraumatic.      Nose: Nose normal. No congestion or rhinorrhea.      Mouth/Throat:      Mouth: Mucous membranes are moist.      Pharynx: No oropharyngeal exudate or posterior oropharyngeal erythema.   Eyes:      Extraocular Movements: Extraocular movements intact.      Conjunctiva/sclera: Conjunctivae normal.      Pupils: Pupils are equal, round, and reactive to light.   Cardiovascular:      Rate and Rhythm: Normal rate and regular rhythm.      Pulses: Normal pulses.      Heart sounds: Normal heart sounds.   Pulmonary:      Effort: Pulmonary effort is normal. No respiratory distress.      Breath sounds: Normal breath sounds. No wheezing.   Abdominal:      General: Abdomen is flat. There is no distension.      Palpations: Abdomen is soft.      Tenderness: There is no abdominal tenderness. There is no guarding.   Musculoskeletal:         General: No swelling or tenderness. Normal range of motion.      Cervical back: Normal range of motion. No rigidity.   Lymphadenopathy:      Cervical: No cervical adenopathy.   Skin:     General: Skin is warm and dry.      Coloration: Skin is not jaundiced.      Findings: No bruising.   Neurological:      Mental Status: He is alert and oriented to person, place, and time.   Psychiatric:         Mood and Affect: Mood normal.           Assessment:       1. Mixed hyperlipidemia    2. Primary hypertension    3. Lumbar disc disease with radiculopathy    4. Hematuria, unspecified type    5. IFG (impaired fasting glucose)         Plan:       Mixed hyperlipidemia  Comments:  stable. continue rosuvastatin 10mg daily    Primary " hypertension  Comments:  BP looks great today! Continue BP meds daily    Lumbar disc disease with radiculopathy  Comments:  stable. continue roboaxin 750mg daily and norco. Dr. Puckett refills medications    Hematuria, unspecified type  Comments:  hematuria persists on ua dip. will send for culture as well as cytology. If develops pain or other symptoms will let me know.  Orders:  -     Cytology, Urine  -     POCT Urinalysis, Dipstick, Automated, W/O Scope    IFG (impaired fasting glucose)  -     POCT HEMOGLOBIN A1C      Follow up in about 3 months (around 12/19/2024) for DR. Puckett.        9/19/2024 TAHIRA SchwartzC

## 2024-09-20 NOTE — TELEPHONE ENCOUNTER
Order pended. Please send back to Miranda once signed so that I can let [facility] know patient is coming Monday and give a heads up as to the order details

## 2024-09-26 ENCOUNTER — TELEPHONE (OUTPATIENT)
Dept: FAMILY MEDICINE | Facility: CLINIC | Age: 69
End: 2024-09-26
Payer: MEDICARE

## 2024-09-26 DIAGNOSIS — R31.9 HEMATURIA, UNSPECIFIED TYPE: ICD-10-CM

## 2024-09-26 DIAGNOSIS — R82.89 ABNORMAL URINE CYTOLOGY: Primary | ICD-10-CM

## 2024-09-26 NOTE — TELEPHONE ENCOUNTER
Atypical cells on cytology. Would like to make sure we have him referred over for cystoscopy with urology.

## 2024-09-26 NOTE — TELEPHONE ENCOUNTER
----- Message from Emily Singh LPN sent at 9/26/2024  4:15 PM CDT -----    ----- Message -----  From: Em Guillermo  Sent: 9/26/2024   4:15 PM CDT  To: [facility]on[facility] Puckett Staff    Returning a phone call   [phone]  [facility]@4:08

## 2024-10-03 ENCOUNTER — OFFICE VISIT (OUTPATIENT)
Dept: UROLOGY | Facility: CLINIC | Age: 69
End: 2024-10-03
Payer: MEDICARE

## 2024-10-03 DIAGNOSIS — R31.0 GROSS HEMATURIA: Primary | ICD-10-CM

## 2024-10-03 DIAGNOSIS — R82.89 ABNORMAL URINE CYTOLOGY: ICD-10-CM

## 2024-10-03 LAB
BACTERIA #/AREA URNS AUTO: ABNORMAL /HPF
BILIRUBIN, UA POC OHS: NEGATIVE
BLOOD, UA POC OHS: ABNORMAL
CLARITY, UA POC OHS: CLEAR
COLOR, UA POC OHS: YELLOW
GLUCOSE, UA POC OHS: NEGATIVE
HYALINE CASTS UR QL AUTO: 5 /LPF
KETONES, UA POC OHS: NEGATIVE
LEUKOCYTES, UA POC OHS: NEGATIVE
MICROSCOPIC COMMENT: ABNORMAL
NITRITE, UA POC OHS: NEGATIVE
PH, UA POC OHS: 7
PROTEIN, UA POC OHS: ABNORMAL
RBC #/AREA URNS AUTO: 4 /HPF (ref 0–4)
SPECIFIC GRAVITY, UA POC OHS: 1.02
SQUAMOUS #/AREA URNS AUTO: 1 /HPF
UROBILINOGEN, UA POC OHS: 0.2
WBC #/AREA URNS AUTO: 4 /HPF (ref 0–5)

## 2024-10-03 PROCEDURE — 3066F NEPHROPATHY DOC TX: CPT | Mod: CPTII,S$GLB,, | Performed by: UROLOGY

## 2024-10-03 PROCEDURE — 3288F FALL RISK ASSESSMENT DOCD: CPT | Mod: CPTII,S$GLB,, | Performed by: UROLOGY

## 2024-10-03 PROCEDURE — 99999 PR PBB SHADOW E&M-EST. PATIENT-LVL III: CPT | Mod: PBBFAC,,, | Performed by: UROLOGY

## 2024-10-03 PROCEDURE — 1160F RVW MEDS BY RX/DR IN RCRD: CPT | Mod: CPTII,S$GLB,, | Performed by: UROLOGY

## 2024-10-03 PROCEDURE — 81001 URINALYSIS AUTO W/SCOPE: CPT | Performed by: UROLOGY

## 2024-10-03 PROCEDURE — 1159F MED LIST DOCD IN RCRD: CPT | Mod: CPTII,S$GLB,, | Performed by: UROLOGY

## 2024-10-03 PROCEDURE — 81003 URINALYSIS AUTO W/O SCOPE: CPT | Mod: QW,S$GLB,, | Performed by: UROLOGY

## 2024-10-03 PROCEDURE — 99204 OFFICE O/P NEW MOD 45 MIN: CPT | Mod: S$GLB,,, | Performed by: UROLOGY

## 2024-10-03 PROCEDURE — 1101F PT FALLS ASSESS-DOCD LE1/YR: CPT | Mod: CPTII,S$GLB,, | Performed by: UROLOGY

## 2024-10-03 PROCEDURE — 87086 URINE CULTURE/COLONY COUNT: CPT | Performed by: UROLOGY

## 2024-10-03 PROCEDURE — 3061F NEG MICROALBUMINURIA REV: CPT | Mod: CPTII,S$GLB,, | Performed by: UROLOGY

## 2024-10-03 PROCEDURE — 1126F AMNT PAIN NOTED NONE PRSNT: CPT | Mod: CPTII,S$GLB,, | Performed by: UROLOGY

## 2024-10-03 PROCEDURE — 88112 CYTOPATH CELL ENHANCE TECH: CPT | Performed by: STUDENT IN AN ORGANIZED HEALTH CARE EDUCATION/TRAINING PROGRAM

## 2024-10-03 NOTE — PROGRESS NOTES
"[facility] New Patient/H&P:    Matt Martinez is a 69 y.o. male who presents for gross hematuria.    Patient with a history of HTN, JENNIFER and hepatitis A who presents with an episode of painless gross hematuria on 24 while on a cruise ship. States that his hematuria has since resolved. No prior history of hematuria.     Urine dipstick on 24 and today with positive blood.     No significant lower urinary tract symptoms.     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 Never smoked. Mother  from pancreatic cancer.     Denies any fever, chills, flank pain, bone pain, unintentional weight loss,  trauma or history of  malignancy.       PSA  2.35  24    Urine cytology  Atypical 24    Past Medical History:   Diagnosis Date    Cancer     SQUAMOUS CELL CARCINOMA R FA    Hepatitis a without hepatic coma     Hepatitis a without hepatic coma     Hep A during [place]....."Ate monkey meat."    HTN (hypertension)     Sleep apnea        Past Surgical History:   Procedure Laterality Date    COLONOSCOPY      colon polyps removed    COLONOSCOPY N/A 2017    Procedure: COLONOSCOPY;  Surgeon: Alexei Claudio MD;  Location: [facility];  Service: Endoscopy;  Laterality: N/A;    INJECTION OF ANESTHETIC AGENT AROUND MEDIAL BRANCH NERVES INNERVATING LUMBAR FACET JOINT Right 2023    Procedure: Block-nerve-medial branch-lumbar;  Surgeon: Matt Ma MD;  Location: [facility] OR;  Service: Pain Management;  Laterality: Right;  L3,4,5 MBB (oneyda)    INJECTION OF ANESTHETIC AGENT AROUND MEDIAL BRANCH NERVES INNERVATING LUMBAR FACET JOINT Right 2023    Procedure: Block-nerve-medial branch-lumbar;  Surgeon: Matt Ma MD;  Location: [facility] OR;  Service: Pain Management;  Laterality: Right;  L3,4,5 #2   DR MORIN    RADIOFREQUENCY ABLATION OF LUMBAR MEDIAL BRANCH NERVE AT SINGLE LEVEL Right 2/15/2023    Procedure: Radiofrequency Ablation, Nerve, Spinal, Lumbar, Medial Branch, L3, 4 ,5;  " Surgeon: Matt Ma MD;  Location: [facility] OR;  Service: Pain Management;  Laterality: Right;    TONSILLECTOMY      TRANSFORAMINAL EPIDURAL INJECTION OF STEROID Right 9/23/2022    Procedure: Injection,steroid,epidural,transforaminal approach;  Surgeon: Mara Jackson MD;  Location: [facility] OR;  Service: Pain Management;  Laterality: Right;  L3-L4, L4-L5, L5-S1    TRANSFORAMINAL EPIDURAL INJECTION OF STEROID Right 11/21/2022    Procedure: INJECTION, STEROID, EPIDURAL, TRANSFORAMINAL APPROACH  RIGHT L3-4;  Surgeon: Mara Jackson MD;  Location: [facility] OR;  Service: Pain Management;  Laterality: Right;    TRANSFORAMINAL EPIDURAL INJECTION OF STEROID Right 5/3/2023    Procedure: Injection,steroid,epidural,transforaminal approach;  Surgeon: Matt Ma MD;  Location: [facility] OR;  Service: Pain Management;  Laterality: Right;  L4-5 and L5-s1       Family History   Problem Relation Name Age of Onset    Pancreatic cancer Mother      Colon cancer Brother          unsure of age of diagnosis    Crohn's disease Neg Hx      Ulcerative colitis Neg Hx         Review of patient's allergies indicates:   Allergen Reactions    Simvastatin Other (See Comments)     WEAKNESS       Medications Reviewed: see MAR      FOCUSED PHYSICAL EXAM:    There were no vitals filed for this visit.  There is no height or weight on file to calculate BMI.           General: Alert, cooperative, no distress, appears stated age  Abdomen: Soft, non-tender, no CVA tenderness, non-distended      LABS:    Recent Results (from the past 2 weeks)   POCT Urinalysis, Dipstick, Automated, W/O Scope    Collection Time: 09/19/24  2:57 PM   Result Value Ref Range    POC Blood, Urine Positive (A) Negative    POC Bilirubin, Urine Negative Negative    POC Urobilinogen, Urine normal 0.3 - 2.2    POC Ketones, Urine Negative Negative    POC Protein, Urine Negative Negative    POC Nitrates, Urine Negative Negative    POC Glucose, Urine Negative Negative    pH, UA 5.5     POC Specific  Gravity, Urine 1.030 (A) 1.003 - 1.029    POC Leukocytes, Urine Negative Negative   Cytology Specimen-Medical Cytology (Fluid/Wash/Brush)    Collection Time: 09/23/24 10:33 AM   Result Value Ref Range    Clinical Information R319     Screener       CMJ, CT(ASCP) CT screening location: [address], [facility],[address] CLIA: [id number] Slide Preparation performed at: [address], [facility],[address] CLIA: [id number]    Pathologist Name       Jamilah Padron MD, Board Certified in Anatomic Pathology, [phone] (electronic signature)    Source (Spec A) Urine     Gross Description (Spec A) SEE COMMENT     Diagnosis (Spec A)       Atypical. Papillary groups of atypical transitional cells are present with increased nuclear cytoplasmic ratio and hyperchromasia.    Comment (Spec A) SEE COMMENT    POCT Urinalysis(Instrument)    Collection Time: 10/03/24 10:35 AM   Result Value Ref Range    Color, POC UA Yellow Yellow, Straw, Colorless    Clarity, POC UA Clear Clear    Glucose, POC UA Negative Negative    Bilirubin, POC UA Negative Negative    Ketones, POC UA Negative Negative    Spec Grav POC UA 1.025 1.005 - 1.030    Blood, POC UA Trace-intact (A) Negative    pH, POC UA 7.0 5.0 - 8.0    Protein, POC UA Trace (A) Negative    Urobilinogen, POC UA 0.2 <=1.0    Nitrite, POC UA Negative Negative    WBC, POC UA Negative Negative           Assessment/Diagnosis:    1. Gross hematuria  Ambulatory referral/consult to Urology    POCT Urinalysis(Instrument)    CT Urogram Abd Pelvis W WO    Urinalysis Microscopic    Cytology, Urine    Urine culture      2. Abnormal urine cytology  Ambulatory referral/consult to Urology    POCT Urinalysis(Instrument)    CT Urogram Abd Pelvis W WO    Urinalysis Microscopic    Cytology, Urine    Urine culture          Plans:    - I spent 45 minutes of the day of this encounter preparing for, treating and managing this patient. We  discussed the etiology and management of the patient's gross hematuria. Explained that hematuria is multi-factorial and can be secondary to  cancer, obstructive uropathy, nephritis,  trauma,  infections, thrombosis, nephrolithiasis, bleeding disorders and medications. We discussed hematuria work-up and the benefit of further evaluation with cystourethroscopy and CT urogram to rule out any malignancy, stones or other pathology. All risks, benefits and alternatives discussed at length and all questions answered.    - Urine micro, culture and cytology next available.   - CT urogram next available.   - Cystoscopy in clinic next available.

## 2024-10-04 LAB
BACTERIA UR CULT: NO GROWTH
FINAL PATHOLOGIC DIAGNOSIS: NORMAL
Lab: NORMAL

## 2024-10-10 ENCOUNTER — HOSPITAL ENCOUNTER (OUTPATIENT)
Dept: RADIOLOGY | Facility: HOSPITAL | Age: 69
Discharge: HOME | End: 2024-10-10
Attending: UROLOGY
Payer: MEDICARE

## 2024-10-10 DIAGNOSIS — R82.89 ABNORMAL URINE CYTOLOGY: ICD-10-CM

## 2024-10-10 DIAGNOSIS — R31.0 GROSS HEMATURIA: ICD-10-CM

## 2024-10-10 PROCEDURE — 74178 CT ABD&PLV WO CNTR FLWD CNTR: CPT | Mod: TC

## 2024-10-10 PROCEDURE — 25500020 PHARM REV CODE 255

## 2024-10-10 PROCEDURE — 74178 CT ABD&PLV WO CNTR FLWD CNTR: CPT | Mod: 26,,, | Performed by: RADIOLOGY

## 2024-10-10 RX ADMIN — IOHEXOL 125 ML: 350 INJECTION, SOLUTION INTRAVENOUS at 10:10

## 2024-10-22 ENCOUNTER — TELEPHONE (OUTPATIENT)
Dept: UROLOGY | Facility: CLINIC | Age: 69
End: 2024-10-22
Payer: MEDICARE

## 2024-10-28 ENCOUNTER — TELEPHONE (OUTPATIENT)
Dept: UROLOGY | Facility: CLINIC | Age: 69
End: 2024-10-28
Payer: MEDICARE

## 2024-11-15 ENCOUNTER — PROCEDURE VISIT (OUTPATIENT)
Dept: UROLOGY | Facility: CLINIC | Age: 69
End: 2024-11-15
Payer: MEDICARE

## 2024-11-15 VITALS — HEIGHT: 70 IN | BODY MASS INDEX: 60.55 KG/M2

## 2024-11-15 DIAGNOSIS — R31.0 GROSS HEMATURIA: Primary | ICD-10-CM

## 2024-11-15 NOTE — PROCEDURES
[facility]  Operative/Discharge Note    Date: 11/15/2024    Pre-Op Diagnosis: Gross hematuria    Post-Op Diagnosis: Same    Procedure(s) Performed:   1.  Cystoscopy     Specimen(s): None    Staff Surgeon: Tiffanie Kern MD    Assistant Surgeon: Tiffanie Kern Jr, MD    Anesthesia: Local anesthesia topical 2% lidocaine gel    Indications: Matt Martinez is a 69 y.o. male with gross hematuria.     Findings: Mild coaptation of the lateral prostate lobes. Otherwise unremarkable cystoscopy.     Estimated Blood Loss: min    Drains: None    Procedure in Detail:  After risks, benefits and possible complications of cystoscopy were explained, the patient elected to undergo the procedure and informed consent was obtained. All questions were answered in the shara-operative area. The patient was transferred to the cystoscopy suite and placed in the lithotomy position.  The patient was prepped and draped in the usual sterile fashion. Lidocaine jelly was administered for local anesthesia. Time out was performed.    A flexible cystoscope was introduced into the bladder per urethra. This passed easily.  The entire urethra was visualized which showed no masses or strictures.  The prostate was 3 cm in length and appeared to have coaptation of the lateral prostate lobes. The right and left ureteral orifices were identified in the normal anatomic position and were seen effluxing clear urine.  Formal cystoscopy was performed which revealed no masses or lesions suspicious for malignancy, no trabeculations, no bladder stones and no bladder diverticula.      The patient tolerated the procedure well and was transferred to recovery in stable condition.    Disposition: Home    Discharge home today status post uncomplicated procedure as above  Diet - resume home diet  Follow up: RTC as needed - gross hematuria work up unremarkable. Stone diet discussed.   Instructions: Continue home medication.   Meds:     Medication List            Accurate as  of November 15, 2024  8:49 AM. If you have any questions, ask your nurse or doctor.                CONTINUE taking these medications      ADULT LOW DOSE ASPIRIN 81 MG EC tablet  Generic drug: aspirin     amLODIPine 10 MG tablet  Commonly known as: NORVASC     diphenhydrAMINE-acetaminophen  mg Tab  Commonly known as: TYLENOL PM     hydroCHLOROthiazide 25 MG tablet  Commonly known as: HYDRODIURIL     HYDROcodone-acetaminophen 7.5-325 mg per tablet  Commonly known as: NORCO  Take 1 tablet by mouth every 8 (eight) hours as needed for Pain.     losartan 50 MG tablet  Commonly known as: COZAAR     MEN'S 50 PLUS DAILY FORMULA ORAL     methocarbamoL 750 MG Tab  Commonly known as: ROBAXIN  Take 1 tablet (750 mg total) by mouth 3 (three) times daily.     metoprolol succinate 25 MG 24 hr tablet  Commonly known as: TOPROL-XL     rosuvastatin 10 MG tablet  Commonly known as: CRESTOR              Tiffanie Kern Jr, MD

## 2025-03-07 DIAGNOSIS — M51.16 LUMBAR DISC DISEASE WITH RADICULOPATHY: ICD-10-CM

## 2025-03-07 RX ORDER — METHOCARBAMOL 750 MG/1
750 TABLET, FILM COATED ORAL 3 TIMES DAILY
Qty: 90 TABLET | Refills: 2 | Status: SHIPPED | OUTPATIENT
Start: 2025-03-07

## 2025-03-07 RX ORDER — HYDROCODONE BITARTRATE AND ACETAMINOPHEN 7.5; 325 MG/1; MG/1
1 TABLET ORAL EVERY 8 HOURS PRN
Qty: 60 TABLET | Refills: 0 | Status: SHIPPED | OUTPATIENT
Start: 2025-03-07

## 2025-03-07 NOTE — TELEPHONE ENCOUNTER
Was unable to make appt today with Darek, wife came with bottles. Refills pended to Dr Italo Rodríguez and hydrocodone pended to [facility]

## 2025-03-14 ENCOUNTER — OFFICE VISIT (OUTPATIENT)
Dept: FAMILY MEDICINE | Facility: CLINIC | Age: 70
End: 2025-03-14
Payer: MEDICARE

## 2025-03-14 VITALS
WEIGHT: 315 LBS | OXYGEN SATURATION: 95 % | BODY MASS INDEX: 45.1 KG/M2 | DIASTOLIC BLOOD PRESSURE: 78 MMHG | HEIGHT: 70 IN | SYSTOLIC BLOOD PRESSURE: 128 MMHG | HEART RATE: 85 BPM

## 2025-03-14 DIAGNOSIS — Z79.899 ENCOUNTER FOR LONG-TERM (CURRENT) USE OF MEDICATIONS: ICD-10-CM

## 2025-03-14 DIAGNOSIS — M51.16 LUMBAR DISC DISEASE WITH RADICULOPATHY: Primary | ICD-10-CM

## 2025-03-14 DIAGNOSIS — Z51.81 ENCOUNTER FOR THERAPEUTIC DRUG MONITORING: ICD-10-CM

## 2025-03-14 NOTE — PROGRESS NOTES
SUBJECTIVE:    Patient ID: Matt Martinez is a 69 y.o. male.    Chief Complaint: Follow-up (Bottles brought//Pt is here for a check up and medication refills//Pt decline flu vaccine//MARY )    History of Present Illness    CHIEF COMPLAINT:  Matt presents today for follow up.    RECENT ILLNESS:  He experienced an acute illness starting last Wednesday that significantly impacted his functioning, but returned to baseline by the weekend.    GENITOURINARY:  He has a history of gross hematuria which occurred during a cruise, denying any associated pain.    BACK PAIN:  He takes hydrocodone as needed for pain management. Past interventions include multiple injections and nerve ablation procedures.      ROS:  General: -fever, -chills, -fatigue, -weight gain, -weight loss  Eyes: -vision changes, -redness, -discharge  ENT: -ear pain, -nasal congestion, -sore throat  Cardiovascular: -chest pain, -palpitations, -lower extremity edema  Respiratory: -cough, -shortness of breath  Gastrointestinal: -abdominal pain, -nausea, -vomiting, -diarrhea, -constipation, -blood in stool  Genitourinary: -dysuria, -hematuria, -frequency  Musculoskeletal: -joint pain, -muscle pain, +back pain  Skin: -rash, -lesion  Neurological: -headache, -dizziness, -numbness, -tingling  Psychiatric: -anxiety, -depression, -sleep difficulty         Office Visit on 10/03/2024   Component Date Value Ref Range Status    Color, POC UA 10/03/2024 Yellow  Yellow, Straw, Colorless Final    Clarity, POC UA 10/03/2024 Clear  Clear Final    Glucose, POC UA 10/03/2024 Negative  Negative Final    Bilirubin, POC UA 10/03/2024 Negative  Negative Final    Ketones, POC UA 10/03/2024 Negative  Negative Final    Spec Grav POC UA 10/03/2024 1.025  1.005 - 1.030 Final    Blood, POC UA 10/03/2024 Trace-intact (A)  Negative Final    pH, POC UA 10/03/2024 7.0  5.0 - 8.0 Final    Protein, POC UA 10/03/2024 Trace (A)  Negative Final    Urobilinogen, POC UA 10/03/2024 0.2  <=1.0 Final     Nitrite, POC UA 10/03/2024 Negative  Negative Final    WBC, POC UA 10/03/2024 Negative  Negative Final    RBC, UA 10/03/2024 4  0 - 4 /hpf Final    WBC, UA 10/03/2024 4  0 - 5 /hpf Final    Bacteria 10/03/2024 Occasional  None-Occ /hpf Final    Squam Epithel, UA 10/03/2024 1  /hpf Final    Hyaline Casts, UA 10/03/2024 5 (A)  0-1/lpf /lpf Final    Microscopic Comment 10/03/2024 SEE COMMENT   Final    Final Pathologic Diagnosis 10/03/2024    Final                    Value:Urine, voided (cytology):  Negative for high grade urothelial carcinoma.  Acute inflammation      Disclaimer 10/03/2024 Screening was performed at [facility], formerly [facility] [place] [address].   Final    Urine Culture, Routine 10/03/2024 No growth   Final   Telephone on 09/19/2024   Component Date Value Ref Range Status    Clinical Information 09/23/2024 R319   Final    Screener 09/23/2024 CMJ, CT(ASCP) CT screening location: [address],[address] CLIA: [id number] Slide Preparation performed at: [address],[address] CLIA: [id number]   Final    Pathologist Name 09/23/2024 Jamilah Padron MD, Board Certified in Anatomic Pathology, [phone] (electronic signature)   Final    Source (Spec A) 09/23/2024 Urine   Final    Gross Description (Spec A) 09/23/2024 SEE COMMENT   Final    Diagnosis (Spec A) 09/23/2024 Atypical. Papillary groups of atypical transitional cells are present with increased nuclear cytoplasmic ratio and hyperchromasia.   Final    Comment (Spec A) 09/23/2024 SEE COMMENT   Final   Office Visit on 09/19/2024   Component Date Value Ref Range Status    POC Blood, Urine 09/19/2024 Positive (A)  Negative Final    POC Bilirubin, Urine 09/19/2024 Negative  Negative Final    POC Urobilinogen, Urine 09/19/2024 normal  0.3 - 2.2 Final    POC Ketones, Urine 09/19/2024 Negative  Negative Final    POC  "Protein, Urine 09/19/2024 Negative  Negative Final    POC Nitrates, Urine 09/19/2024 Negative  Negative Final    POC Glucose, Urine 09/19/2024 Negative  Negative Final    pH, UA 09/19/2024 5.5   Final    POC Specific Gravity, Urine 09/19/2024 1.030 (A)  1.003 - 1.029 Final    POC Leukocytes, Urine 09/19/2024 Negative  Negative Final       Past Medical History:   Diagnosis Date    Cancer     SQUAMOUS CELL CARCINOMA R FA    Hepatitis a without hepatic coma     Hepatitis a without hepatic coma     Hep A during [place] War....."Ate monkey meat."    HTN (hypertension)     Sleep apnea      Past Surgical History:   Procedure Laterality Date    COLONOSCOPY  2011    colon polyps removed    COLONOSCOPY N/A 01/09/2017    Procedure: COLONOSCOPY;  Surgeon: Alexei Claudio MD;  Location: [facility];  Service: Endoscopy;  Laterality: N/A;    INJECTION OF ANESTHETIC AGENT AROUND MEDIAL BRANCH NERVES INNERVATING LUMBAR FACET JOINT Right 1/11/2023    Procedure: Block-nerve-medial branch-lumbar;  Surgeon: Matt Ma MD;  Location: [facility] OR;  Service: Pain Management;  Laterality: Right;  L3,4,5 MBB (oneyda)    INJECTION OF ANESTHETIC AGENT AROUND MEDIAL BRANCH NERVES INNERVATING LUMBAR FACET JOINT Right 1/25/2023    Procedure: Block-nerve-medial branch-lumbar;  Surgeon: Matt Ma MD;  Location: [facility] OR;  Service: Pain Management;  Laterality: Right;  L3,4,5 #2   DR MORIN    RADIOFREQUENCY ABLATION OF LUMBAR MEDIAL BRANCH NERVE AT SINGLE LEVEL Right 2/15/2023    Procedure: Radiofrequency Ablation, Nerve, Spinal, Lumbar, Medial Branch, L3, 4 ,5;  Surgeon: Matt Ma MD;  Location: [facility];  Service: Pain Management;  Laterality: Right;    TONSILLECTOMY      TRANSFORAMINAL EPIDURAL INJECTION OF STEROID Right 9/23/2022    Procedure: Injection,steroid,epidural,transforaminal approach;  Surgeon: Mara Jackson MD;  Location: [facility];  Service: Pain Management;  Laterality: Right;  L3-L4, L4-L5, L5-S1    TRANSFORAMINAL EPIDURAL " "INJECTION OF STEROID Right 11/21/2022    Procedure: INJECTION, STEROID, EPIDURAL, TRANSFORAMINAL APPROACH  RIGHT L3-4;  Surgeon: Mara Jackson MD;  Location: [facility] OR;  Service: Pain Management;  Laterality: Right;    TRANSFORAMINAL EPIDURAL INJECTION OF STEROID Right 5/3/2023    Procedure: Injection,steroid,epidural,transforaminal approach;  Surgeon: Matt Ma MD;  Location: [facility] OR;  Service: Pain Management;  Laterality: Right;  L4-5 and L5-s1     Family History   Problem Relation Name Age of Onset    Pancreatic cancer Mother Beatriz Rdz     Cancer Mother Beatriz Rdz     Colon cancer Brother Jameson rdz Jr         unsure of age of diagnosis    Asthma Brother Jameson rdz Jr     Arthritis Father Jameson Rdz Sr     Heart disease Father Jameson Rdz Sr     Hypertension Father Jameson Rdz Sr     Crohn's disease Neg Hx      Ulcerative colitis Neg Hx         Marital Status:   Alcohol History:  reports current alcohol use of about 2.0 standard drinks of alcohol per week.  Tobacco History:  reports that he has never smoked. He has never used smokeless tobacco.  Drug History:  reports no history of drug use.    Review of patient's allergies indicates:   Allergen Reactions    Simvastatin Other (See Comments)     WEAKNESS     Current Medications[1]    Objective:      Vitals:    03/14/25 0807   BP: 128/78   Pulse: 85   SpO2: 95%   Weight: (!) 190.1 kg (419 lb)   Height: 5' 10" (1.778 m)     Physical Exam    Vitals: Weight down by four lbs.  General: No acute distress. Well-developed. Well-nourished.  Eyes: EOMI. Sclerae anicteric.  HENT: Normocephalic. Atraumatic. Nares patent. Moist oral mucosa.  Ears: Bilateral TMs clear. Bilateral EACs clear.  Cardiovascular: Regular rate. Regular rhythm. No murmurs. No rubs. No gallops. Normal S1, S2.  Respiratory: Normal respiratory effort. Clear to auscultation bilaterally. No rales. No rhonchi. No wheezing.  Abdomen: Soft. Non-tender. Non-distended. " Normoactive bowel sounds.  Musculoskeletal: No  obvious deformity.  Extremities: No lower extremity edema.  Neurological: Alert & oriented x3. No slurred speech. Normal gait.  Psychiatric: Normal mood. Normal affect. Good insight. Good judgment.  Skin: Warm. Dry. No rash.         Assessment:       Assessment & Plan    - Reviewed recent urological evaluation for gross hematuria.  - CT urogram showed no concerning bladder masses.  - Identified 8 mm kidney stone on right side with potential for future complications.  - Smaller kidney stones present, may pass spontaneously without symptoms.  - Assessed labs from September, noted all values WNL except for hematuria.  - Evaluated current pain management regimen for chronic back pain.  - Considered weight loss as potential factor in improving back pain.    MORBID (SEVERE) OBESITY DUE TO EXCESS CALORIES:  - Monitored patient's weight, noting a 4-pound decrease since the last visit.  - Acknowledged patient's need for further weight loss and encouraged their plan to use the pool for water exercises, which can be beneficial for weight management.    CHRONIC HEPATITIS, UNSPECIFIED:  - Previous labs from September looked good, except for blood in urine.  - Planned to check labs again in September, approximately 6 months from now, to continue monitoring.  - Physical exam revealed clear lungs and normal heart sounds.    KIDNEY STONES:  - CT urogram confirmed multiple kidney stones, including an 8mm stone on the right side.  - Adopted a monitoring approach.  - Explained potential symptoms of kidney stone passage, including flank pain radiating to groin, urinary symptoms, and possible hematuria episodes.  - Discussed the risk of hydronephrosis if the 8mm stone causes a blockage.  - Instructed the patient to contact the office if experiencing significant flank pain, abdominal pain radiating to groin, or problems urinating.    GROSS HEMATURIA:  - Monitored the patient's episode of  gross hematuria during a cruise.  - Evaluated through cystoscopy and CT urogram, which found no bladder masses.  - Attributed the hematuria to kidney stones that were likely passed.  - Explained that future episodes could occur due to remaining kidney stones.  - Continuing with a monitoring approach.    LOW BACK PAIN MANAGEMENT:  - Continued hydrocodone prescription as needed for back pain, current dosage every 8 hours.  - Matt takes the medication as needed, not regularly.  - Noted previous treatments including injections and nerve ablations.  - Performed urine drug screen today and planned future tests for NAOMI compliance with controlled substance monitoring.  - Instructed the patient to contact the office for medication refills.    WEIGHT MANAGEMENT:  - Recommend increasing physical activity when possible to aid in further weight loss.  - Encouraged patient's plan to use the pool for water exercises.    FOLLOW-UP PLAN:  - Labs ordered for September.  - Follow up in 6 months for annual follow-up and labs.       Plan:       Lumbar disc disease with radiculopathy  -     DRUG MONITOR, PANEL 4, W/CONF, URINE; Future; Expected date: 03/14/2025    Encounter for therapeutic drug monitoring  -     DRUG MONITOR, PANEL 4, W/CONF, URINE; Future; Expected date: 03/14/2025    Encounter for long-term (current) use of medications  -     DRUG MONITOR, PANEL 4, W/CONF, URINE; Future; Expected date: 03/14/2025      Follow up in about 6 months (around 9/14/2025) for Annual Physical.    This note was generated with the assistance of ambient listening technology. Verbal consent was obtained by the patient and accompanying visitor(s) for the recording of patient appointment to facilitate this note. I attest to having reviewed and edited the generated note for accuracy, though some syntax or spelling errors may persist. Please contact the author of this note for any clarification.          3/14/2025 Bill Randhawa PA-C           [1]    Current Outpatient Medications:     amlodipine (NORVASC) 10 MG tablet, Take 15 mg by mouth once daily., Disp: , Rfl:     aspirin (ADULT LOW DOSE ASPIRIN) 81 MG EC tablet, , Disp: , Rfl:     diphenhydrAMINE-acetaminophen (TYLENOL PM)  mg Tab, Take 1 tablet by mouth nightly as needed., Disp: , Rfl:     hydrochlorothiazide (HYDRODIURIL) 25 MG tablet, Take 25 mg by mouth once daily., Disp: , Rfl:     HYDROcodone-acetaminophen (NORCO) 7.5-325 mg per tablet, Take 1 tablet by mouth every 8 (eight) hours as needed for Pain., Disp: 60 tablet, Rfl: 0    losartan (COZAAR) 50 MG tablet, Take 100 mg by mouth once daily., Disp: , Rfl:     methocarbamoL (ROBAXIN) 750 MG Tab, Take 1 tablet (750 mg total) by mouth 3 (three) times daily., Disp: 90 tablet, Rfl: 2    metoprolol succinate (TOPROL-XL) 25 MG 24 hr tablet, 25 mg once daily. 12.5 mg daily, Disp: , Rfl:     multivit-min/folic/vit K/lycop (MEN'S 50 PLUS DAILY FORMULA ORAL), , Disp: , Rfl:     rosuvastatin (CRESTOR) 10 MG tablet, Take 10 mg by mouth once daily. (Patient not taking: Reported on 3/14/2025), Disp: , Rfl:   No current facility-administered medications for this visit.    Facility-Administered Medications Ordered in Other Visits:     0.9%  NaCl infusion, 500 mL, Intravenous, Continuous, Matt Ma MD    lactated ringers infusion, , Intravenous, Once PRN, Mara Jackson MD

## (undated) DEVICE — APPLICATOR CHLORAPREP ORN 26ML

## (undated) DEVICE — SPONGE BULKEE II ABSRB 6X6.75

## (undated) DEVICE — UNDERGLOVES BIOGEL PI SIZE 7.5

## (undated) DEVICE — SYR DISP LL 5CC

## (undated) DEVICE — SYR LUER LOCK STERILE 10ML

## (undated) DEVICE — COVER PROXIMA MAYO STAND

## (undated) DEVICE — PAD ELECTROSURGICAL PAT PLATE

## (undated) DEVICE — NDL HYPODERMIC BLUNT 18G 1.5IN

## (undated) DEVICE — NDL SPINAL 25GX3.5 SPINOCAN

## (undated) DEVICE — SYS LABEL CORRECT MED

## (undated) DEVICE — STRAP OR TABLE 5IN X 72IN

## (undated) DEVICE — PAD GROUNDING DISPER ELECTRODE

## (undated) DEVICE — SET EXTENSION STERILE 30IN

## (undated) DEVICE — GLOVE SURG ULTRA TOUCH 7.5

## (undated) DEVICE — NDL TUOHY EPIDURAL 20G X 3.5

## (undated) DEVICE — NDL SPINAL 22GX5

## (undated) DEVICE — TOWEL OR DISP STRL BLUE 4/PK

## (undated) DEVICE — GLOVE SURG ULTRA TOUCH 6

## (undated) DEVICE — CANNULA RF RADIOPAQUE 18G 145

## (undated) DEVICE — CHLORAPREP 10.5 ML APPLICATOR

## (undated) DEVICE — GLOVE SURGEONS ULTRA TOUCH 6.5

## (undated) DEVICE — NDL SAFETY 25G X 1.5 ECLIPSE

## (undated) DEVICE — DRAPE C ARM 42 X 120 10/BX

## (undated) DEVICE — NDL SPINAL SPINOCAN 22GX3.5

## (undated) DEVICE — TUBING MINIBORE EXTENSION

## (undated) DEVICE — DRAPE THREE-QTR REINF 53X77IN

## (undated) DEVICE — CLOSURE SKIN STERI STRIP 1/2X4

## (undated) DEVICE — SYR GLASS 5CC LUER LOK

## (undated) DEVICE — CANNULA RADIOPAQUE 20G CURVED

## (undated) DEVICE — NDL SPINAL CHIBA 22GX6 GRAD

## (undated) DEVICE — SKINMARKER W/RULER DEVON

## (undated) DEVICE — KIT COOLED RF 75MM SGL PROBE

## (undated) DEVICE — SYR 10CC LOSS OF RESISTANCE